# Patient Record
Sex: FEMALE | Race: ASIAN
[De-identification: names, ages, dates, MRNs, and addresses within clinical notes are randomized per-mention and may not be internally consistent; named-entity substitution may affect disease eponyms.]

---

## 2024-09-10 ENCOUNTER — TRANSCRIPTION ENCOUNTER (OUTPATIENT)
Age: 29
End: 2024-09-10

## 2024-09-10 ENCOUNTER — ASOB RESULT (OUTPATIENT)
Age: 29
End: 2024-09-10

## 2024-09-10 ENCOUNTER — APPOINTMENT (OUTPATIENT)
Dept: ANTEPARTUM | Facility: CLINIC | Age: 29
End: 2024-09-10

## 2024-09-10 PROBLEM — Z00.00 ENCOUNTER FOR PREVENTIVE HEALTH EXAMINATION: Status: ACTIVE | Noted: 2024-09-10

## 2024-09-10 PROCEDURE — 76813 OB US NUCHAL MEAS 1 GEST: CPT

## 2024-09-10 PROCEDURE — 76801 OB US < 14 WKS SINGLE FETUS: CPT | Mod: 59

## 2024-09-24 ENCOUNTER — APPOINTMENT (OUTPATIENT)
Dept: OBGYN | Facility: CLINIC | Age: 29
End: 2024-09-24

## 2024-10-01 ENCOUNTER — APPOINTMENT (OUTPATIENT)
Dept: OBGYN | Facility: CLINIC | Age: 29
End: 2024-10-01
Payer: MEDICAID

## 2024-10-01 VITALS
SYSTOLIC BLOOD PRESSURE: 110 MMHG | BODY MASS INDEX: 22.21 KG/M2 | WEIGHT: 113.13 LBS | DIASTOLIC BLOOD PRESSURE: 60 MMHG | HEIGHT: 60 IN

## 2024-10-01 DIAGNOSIS — O21.9 VOMITING OF PREGNANCY, UNSPECIFIED: ICD-10-CM

## 2024-10-01 DIAGNOSIS — Z78.9 OTHER SPECIFIED HEALTH STATUS: ICD-10-CM

## 2024-10-01 PROCEDURE — 99203 OFFICE O/P NEW LOW 30 MIN: CPT | Mod: TH

## 2024-10-01 RX ORDER — DOXYLAMINE SUCCINATE AND PYRIDOXINE HYDROCHLORIDE 10; 10 MG/1; MG/1
10-10 TABLET, DELAYED RELEASE ORAL
Refills: 0 | Status: ACTIVE | COMMUNITY
Start: 2024-10-01

## 2024-10-01 RX ORDER — ONDANSETRON 4 MG/1
4 TABLET, ORALLY DISINTEGRATING ORAL
Qty: 60 | Refills: 0 | Status: ACTIVE | COMMUNITY
Start: 2024-10-01

## 2024-10-02 LAB
C TRACH RRNA SPEC QL NAA+PROBE: NOT DETECTED
N GONORRHOEA RRNA SPEC QL NAA+PROBE: NOT DETECTED
SOURCE AMPLIFICATION: NORMAL

## 2024-10-07 ENCOUNTER — APPOINTMENT (OUTPATIENT)
Dept: OBGYN | Facility: CLINIC | Age: 29
End: 2024-10-07

## 2024-10-08 ENCOUNTER — APPOINTMENT (OUTPATIENT)
Dept: OBGYN | Facility: CLINIC | Age: 29
End: 2024-10-08

## 2024-10-09 ENCOUNTER — INPATIENT (INPATIENT)
Facility: HOSPITAL | Age: 29
LOS: 0 days | Discharge: LEFT BEFORE DISCHARGE | End: 2024-10-10
Attending: INTERNAL MEDICINE | Admitting: INTERNAL MEDICINE
Payer: MEDICAID

## 2024-10-09 VITALS
RESPIRATION RATE: 20 BRPM | DIASTOLIC BLOOD PRESSURE: 82 MMHG | HEIGHT: 60 IN | OXYGEN SATURATION: 99 % | WEIGHT: 113.1 LBS | TEMPERATURE: 97 F | SYSTOLIC BLOOD PRESSURE: 127 MMHG | HEART RATE: 109 BPM

## 2024-10-09 LAB
ALBUMIN SERPL ELPH-MCNC: 3.2 G/DL — LOW (ref 3.3–5)
ALP SERPL-CCNC: 43 U/L — SIGNIFICANT CHANGE UP (ref 40–120)
ALT FLD-CCNC: 30 U/L — SIGNIFICANT CHANGE UP (ref 12–78)
ANION GAP SERPL CALC-SCNC: 10 MMOL/L — SIGNIFICANT CHANGE UP (ref 5–17)
AST SERPL-CCNC: 17 U/L — SIGNIFICANT CHANGE UP (ref 15–37)
BASOPHILS # BLD AUTO: 0.02 K/UL — SIGNIFICANT CHANGE UP (ref 0–0.2)
BASOPHILS NFR BLD AUTO: 0.2 % — SIGNIFICANT CHANGE UP (ref 0–2)
BILIRUB SERPL-MCNC: 0.2 MG/DL — SIGNIFICANT CHANGE UP (ref 0.2–1.2)
BUN SERPL-MCNC: 7 MG/DL — SIGNIFICANT CHANGE UP (ref 7–23)
CALCIUM SERPL-MCNC: 8.8 MG/DL — SIGNIFICANT CHANGE UP (ref 8.5–10.1)
CHLORIDE SERPL-SCNC: 107 MMOL/L — SIGNIFICANT CHANGE UP (ref 96–108)
CO2 SERPL-SCNC: 22 MMOL/L — SIGNIFICANT CHANGE UP (ref 22–31)
CREAT SERPL-MCNC: 0.62 MG/DL — SIGNIFICANT CHANGE UP (ref 0.5–1.3)
D DIMER BLD IA.RAPID-MCNC: 860 NG/ML DDU — HIGH
EGFR: 126 ML/MIN/1.73M2 — SIGNIFICANT CHANGE UP
EOSINOPHIL # BLD AUTO: 0.09 K/UL — SIGNIFICANT CHANGE UP (ref 0–0.5)
EOSINOPHIL NFR BLD AUTO: 1.1 % — SIGNIFICANT CHANGE UP (ref 0–6)
GLUCOSE SERPL-MCNC: 90 MG/DL — SIGNIFICANT CHANGE UP (ref 70–99)
HCG SERPL-ACNC: HIGH MIU/ML
HCT VFR BLD CALC: 32 % — LOW (ref 34.5–45)
HGB BLD-MCNC: 11.4 G/DL — LOW (ref 11.5–15.5)
IMM GRANULOCYTES NFR BLD AUTO: 0.5 % — SIGNIFICANT CHANGE UP (ref 0–0.9)
LYMPHOCYTES # BLD AUTO: 1.41 K/UL — SIGNIFICANT CHANGE UP (ref 1–3.3)
LYMPHOCYTES # BLD AUTO: 17.5 % — SIGNIFICANT CHANGE UP (ref 13–44)
MCHC RBC-ENTMCNC: 30.2 PG — SIGNIFICANT CHANGE UP (ref 27–34)
MCHC RBC-ENTMCNC: 35.6 G/DL — SIGNIFICANT CHANGE UP (ref 32–36)
MCV RBC AUTO: 84.7 FL — SIGNIFICANT CHANGE UP (ref 80–100)
MONOCYTES # BLD AUTO: 0.6 K/UL — SIGNIFICANT CHANGE UP (ref 0–0.9)
MONOCYTES NFR BLD AUTO: 7.4 % — SIGNIFICANT CHANGE UP (ref 2–14)
NEUTROPHILS # BLD AUTO: 5.91 K/UL — SIGNIFICANT CHANGE UP (ref 1.8–7.4)
NEUTROPHILS NFR BLD AUTO: 73.3 % — SIGNIFICANT CHANGE UP (ref 43–77)
NRBC # BLD: 0 /100 WBCS — SIGNIFICANT CHANGE UP (ref 0–0)
PLATELET # BLD AUTO: 227 K/UL — SIGNIFICANT CHANGE UP (ref 150–400)
POTASSIUM SERPL-MCNC: 3.8 MMOL/L — SIGNIFICANT CHANGE UP (ref 3.5–5.3)
POTASSIUM SERPL-SCNC: 3.8 MMOL/L — SIGNIFICANT CHANGE UP (ref 3.5–5.3)
PROT SERPL-MCNC: 6.9 GM/DL — SIGNIFICANT CHANGE UP (ref 6–8.3)
RBC # BLD: 3.78 M/UL — LOW (ref 3.8–5.2)
RBC # FLD: 14.6 % — HIGH (ref 10.3–14.5)
SODIUM SERPL-SCNC: 139 MMOL/L — SIGNIFICANT CHANGE UP (ref 135–145)
TROPONIN I, HIGH SENSITIVITY RESULT: 3 NG/L — SIGNIFICANT CHANGE UP
WBC # BLD: 8.07 K/UL — SIGNIFICANT CHANGE UP (ref 3.8–10.5)
WBC # FLD AUTO: 8.07 K/UL — SIGNIFICANT CHANGE UP (ref 3.8–10.5)

## 2024-10-09 PROCEDURE — 99285 EMERGENCY DEPT VISIT HI MDM: CPT

## 2024-10-09 PROCEDURE — 99222 1ST HOSP IP/OBS MODERATE 55: CPT

## 2024-10-09 PROCEDURE — 93010 ELECTROCARDIOGRAM REPORT: CPT

## 2024-10-09 PROCEDURE — 71045 X-RAY EXAM CHEST 1 VIEW: CPT | Mod: 26

## 2024-10-09 RX ORDER — ACETAMINOPHEN 325 MG
1000 TABLET ORAL ONCE
Refills: 0 | Status: COMPLETED | OUTPATIENT
Start: 2024-10-09 | End: 2024-10-09

## 2024-10-09 RX ORDER — ACETAMINOPHEN 325 MG
650 TABLET ORAL EVERY 6 HOURS
Refills: 0 | Status: DISCONTINUED | OUTPATIENT
Start: 2024-10-09 | End: 2024-10-10

## 2024-10-09 RX ORDER — ENOXAPARIN SODIUM 150 MG/ML
40 INJECTION SUBCUTANEOUS EVERY 24 HOURS
Refills: 0 | Status: DISCONTINUED | OUTPATIENT
Start: 2024-10-09 | End: 2024-10-10

## 2024-10-09 RX ORDER — 5-HYDROXYTRYPTOPHAN (5-HTP) 100 MG
3 TABLET,DISINTEGRATING ORAL AT BEDTIME
Refills: 0 | Status: DISCONTINUED | OUTPATIENT
Start: 2024-10-09 | End: 2024-10-10

## 2024-10-09 RX ORDER — ONDANSETRON HCL/PF 4 MG/2 ML
4 VIAL (ML) INJECTION EVERY 8 HOURS
Refills: 0 | Status: DISCONTINUED | OUTPATIENT
Start: 2024-10-09 | End: 2024-10-10

## 2024-10-09 RX ORDER — MAG HYDROX/ALUMINUM HYD/SIMETH 200-200-20
30 SUSPENSION, ORAL (FINAL DOSE FORM) ORAL EVERY 4 HOURS
Refills: 0 | Status: DISCONTINUED | OUTPATIENT
Start: 2024-10-09 | End: 2024-10-10

## 2024-10-09 RX ADMIN — Medication 400 MILLIGRAM(S): at 22:53

## 2024-10-09 NOTE — ED PROVIDER NOTE - PHYSICAL EXAMINATION
General: Well appearing female in no acute distress  HEENT: Normocephalic, atraumatic. Moist mucous membranes. Oropharynx clear. No lymphadenopathy.  Eyes: No scleral icterus. EOMI. TAWANDA.  Neck:. Soft and supple. Full ROM without pain. No midline tenderness  Cardiac: Regular rate and regular rhythm. No murmurs, rubs, gallops. Peripheral pulses 2+ and symmetric. No LE edema.  Resp: Lungs CTAB. Speaking in full sentences. No wheezes, rales or rhonchi.  Abd: Soft, non-tender, non-distended. No guarding or rebound. No scars, masses, or lesions.  Back: Spine midline and non-tender. No CVA tenderness.    Skin: No rashes, abrasions, or lacerations.  Neuro: AO x 3. Moves all extremities symmetrically. Motor strength and sensation grossly intact.

## 2024-10-09 NOTE — ED ADULT NURSE NOTE - CAS ED AMA FORM SIGNED YN
pt signed however provider was unavailable to sign, RN witnessed. MERI Andres notified and approved pt leaving.

## 2024-10-09 NOTE — ED ADULT NURSE NOTE - OBJECTIVE STATEMENT
AAOx3 pt presents to ED c/o sob starting today associated with chest discomfort, sent by urgent care to r/o PE. Pt also c/o abdominal cramps.  17 weeks gestation a0. At time of assessment respirations spontaneous and unlabored, pt speaks in full sentences. Pt appear comfortable.

## 2024-10-09 NOTE — ED PROVIDER NOTE - CLINICAL SUMMARY MEDICAL DECISION MAKING FREE TEXT BOX
27 y/o F 17 weeks pregnant presents w/ sob. states she was passenger in car today when she felt chest pain w/ sob. went to urgent care and was told she might have a blood clot. patient states chest pain is generalized. 2 years ago did use hormonal OCP. denies prior hx of blood clots. hx of 1 . denies fever/chills. denies dysuria. denies vaginal bleeding. denies abdominal pain.   EKG NSR, non-ischemic  consider PE, will send d-dimer   low clinical suspicion of ACS  reproducible chest wall pain b/l - consider likely MSK / chest wall pain  pain control   patient agreeable to cxr, signed waiver after discussion regarding risks/benefits.     d-dimer elevated, will admit for VQ scan as patient is pregnant, defer CTA at this time. is hemodynamically stable.

## 2024-10-09 NOTE — ED PROVIDER NOTE - OBJECTIVE STATEMENT
29 y/o F 17 weeks pregnant presents w/ sob. states she was passenger in car today when she felt chest pain w/ sob. went to urgent care and was told she might have a blood clot. patient states chest pain is generalized. 2 years ago did use hormonal OCP. denies prior hx of blood clots. hx of 1 . denies fever/chills. denies dysuria. denies vaginal bleeding. denies abdominal pain.

## 2024-10-09 NOTE — ED ADULT TRIAGE NOTE - CHIEF COMPLAINT QUOTE
c/o sob x 1 hour with chest discomfort, sent by urgent care to r/o pe c/o abdominal cramps 17 weeks gestation a0

## 2024-10-10 VITALS
HEART RATE: 78 BPM | RESPIRATION RATE: 14 BRPM | DIASTOLIC BLOOD PRESSURE: 66 MMHG | TEMPERATURE: 98 F | OXYGEN SATURATION: 98 % | SYSTOLIC BLOOD PRESSURE: 109 MMHG

## 2024-10-10 DIAGNOSIS — Z34.00 ENCOUNTER FOR SUPERVISION OF NORMAL FIRST PREGNANCY, UNSPECIFIED TRIMESTER: ICD-10-CM

## 2024-10-10 DIAGNOSIS — Z29.9 ENCOUNTER FOR PROPHYLACTIC MEASURES, UNSPECIFIED: ICD-10-CM

## 2024-10-10 DIAGNOSIS — R07.9 CHEST PAIN, UNSPECIFIED: ICD-10-CM

## 2024-10-10 DIAGNOSIS — R06.00 DYSPNEA, UNSPECIFIED: ICD-10-CM

## 2024-10-10 LAB
A1C WITH ESTIMATED AVERAGE GLUCOSE RESULT: 4.8 % — SIGNIFICANT CHANGE UP (ref 4–5.6)
ALBUMIN SERPL ELPH-MCNC: 3 G/DL — LOW (ref 3.3–5)
ALP SERPL-CCNC: 39 U/L — LOW (ref 40–120)
ALT FLD-CCNC: 25 U/L — SIGNIFICANT CHANGE UP (ref 12–78)
ANION GAP SERPL CALC-SCNC: 4 MMOL/L — LOW (ref 5–17)
AST SERPL-CCNC: 15 U/L — SIGNIFICANT CHANGE UP (ref 15–37)
BASOPHILS # BLD AUTO: 0.01 K/UL — SIGNIFICANT CHANGE UP (ref 0–0.2)
BASOPHILS NFR BLD AUTO: 0.1 % — SIGNIFICANT CHANGE UP (ref 0–2)
BILIRUB SERPL-MCNC: 0.3 MG/DL — SIGNIFICANT CHANGE UP (ref 0.2–1.2)
BUN SERPL-MCNC: 6 MG/DL — LOW (ref 7–23)
CALCIUM SERPL-MCNC: 8.2 MG/DL — LOW (ref 8.5–10.1)
CHLORIDE SERPL-SCNC: 108 MMOL/L — SIGNIFICANT CHANGE UP (ref 96–108)
CHOLEST SERPL-MCNC: 174 MG/DL — SIGNIFICANT CHANGE UP
CO2 SERPL-SCNC: 25 MMOL/L — SIGNIFICANT CHANGE UP (ref 22–31)
CREAT SERPL-MCNC: 0.46 MG/DL — LOW (ref 0.5–1.3)
EGFR: 135 ML/MIN/1.73M2 — SIGNIFICANT CHANGE UP
EOSINOPHIL # BLD AUTO: 0.11 K/UL — SIGNIFICANT CHANGE UP (ref 0–0.5)
EOSINOPHIL NFR BLD AUTO: 1.6 % — SIGNIFICANT CHANGE UP (ref 0–6)
ESTIMATED AVERAGE GLUCOSE: 91 MG/DL — SIGNIFICANT CHANGE UP (ref 68–114)
GLUCOSE SERPL-MCNC: 87 MG/DL — SIGNIFICANT CHANGE UP (ref 70–99)
HCT VFR BLD CALC: 29.5 % — LOW (ref 34.5–45)
HDLC SERPL-MCNC: 70 MG/DL — SIGNIFICANT CHANGE UP
HGB BLD-MCNC: 10.2 G/DL — LOW (ref 11.5–15.5)
IMM GRANULOCYTES NFR BLD AUTO: 0.6 % — SIGNIFICANT CHANGE UP (ref 0–0.9)
LIPID PNL WITH DIRECT LDL SERPL: 93 MG/DL — SIGNIFICANT CHANGE UP
LYMPHOCYTES # BLD AUTO: 1.62 K/UL — SIGNIFICANT CHANGE UP (ref 1–3.3)
LYMPHOCYTES # BLD AUTO: 24.1 % — SIGNIFICANT CHANGE UP (ref 13–44)
MAGNESIUM SERPL-MCNC: 1.9 MG/DL — SIGNIFICANT CHANGE UP (ref 1.6–2.6)
MCHC RBC-ENTMCNC: 29.7 PG — SIGNIFICANT CHANGE UP (ref 27–34)
MCHC RBC-ENTMCNC: 34.6 G/DL — SIGNIFICANT CHANGE UP (ref 32–36)
MCV RBC AUTO: 86 FL — SIGNIFICANT CHANGE UP (ref 80–100)
MONOCYTES # BLD AUTO: 0.59 K/UL — SIGNIFICANT CHANGE UP (ref 0–0.9)
MONOCYTES NFR BLD AUTO: 8.8 % — SIGNIFICANT CHANGE UP (ref 2–14)
NEUTROPHILS # BLD AUTO: 4.35 K/UL — SIGNIFICANT CHANGE UP (ref 1.8–7.4)
NEUTROPHILS NFR BLD AUTO: 64.8 % — SIGNIFICANT CHANGE UP (ref 43–77)
NON HDL CHOLESTEROL: 104 MG/DL — SIGNIFICANT CHANGE UP
NRBC # BLD: 0 /100 WBCS — SIGNIFICANT CHANGE UP (ref 0–0)
PHOSPHATE SERPL-MCNC: 3.8 MG/DL — SIGNIFICANT CHANGE UP (ref 2.5–4.5)
PLATELET # BLD AUTO: 200 K/UL — SIGNIFICANT CHANGE UP (ref 150–400)
POTASSIUM SERPL-MCNC: 3.4 MMOL/L — LOW (ref 3.5–5.3)
POTASSIUM SERPL-SCNC: 3.4 MMOL/L — LOW (ref 3.5–5.3)
PROT SERPL-MCNC: 6.3 GM/DL — SIGNIFICANT CHANGE UP (ref 6–8.3)
RBC # BLD: 3.43 M/UL — LOW (ref 3.8–5.2)
RBC # FLD: 14.6 % — HIGH (ref 10.3–14.5)
SODIUM SERPL-SCNC: 137 MMOL/L — SIGNIFICANT CHANGE UP (ref 135–145)
TRIGL SERPL-MCNC: 54 MG/DL — SIGNIFICANT CHANGE UP
WBC # BLD: 6.72 K/UL — SIGNIFICANT CHANGE UP (ref 3.8–10.5)
WBC # FLD AUTO: 6.72 K/UL — SIGNIFICANT CHANGE UP (ref 3.8–10.5)

## 2024-10-10 NOTE — ED ADULT NURSE REASSESSMENT NOTE - NS ED NURSE REASSESS COMMENT FT1
pt left .AMA, form signed by pt and EMILE Melgoza, however pt insisted that she needed to leave prior to MERI Andres being available to come down and speak with pt. pt A&Ox4 escorted by SO out of ED and stated she will "follow up with OB". MERI Heard notified.

## 2024-10-10 NOTE — CHART NOTE - NSCHARTNOTEFT_GEN_A_CORE
Called by RN as patient was requesting to leave AMA. At that time I was involved in a high priority other patient-impacting issue and was unable to present to the bedside to speak to the patient. Immediately after, I attempted to go speak to the patient about her concerns and risks of leaving AMA but was notified that she already left the hospital.   RN documents that patient was A&Ox4 and appeared to have capacity to make medical decisions.

## 2024-10-10 NOTE — H&P ADULT - PROBLEM SELECTOR PLAN 1
- Presented with dyspnea possibly due to mechanical pressure in the setting of pregnancy, rule out PE  - D-dimer 860  - EKG, troponin, and CXR wnl  - Currently with good O2 sat on room air  - f/u V/Q scan - Presented with dyspnea possibly due to mechanical pressure in the setting of pregnancy, rule out other etiologies including PE  - D-dimer 860  - EKG, troponin, and CXR wnl  - Currently with good O2 sat on room air  - f/u V/Q scan

## 2024-10-10 NOTE — H&P ADULT - ASSESSMENT
28-year-old female at 17 weeks gestation,  who was sent to the ED by urgent care for shortness of breath and chest pain. . On presentation, slightly tachycardic otherwise stable vital signs. Labs revealed D-dimer 860.

## 2024-10-10 NOTE — H&P ADULT - HISTORY OF PRESENT ILLNESS
28-year-old female at 17 weeks gestation,  who was sent to the ED by urgent care for shortness of breath and chest pain. The patient describes chest pain as left-sided, achy, lasting less than a minute, associated with shortness of breath. She denies any cough, sick contact, fevers, chills, heart palpitations, or any other active complaints.   On presentation, slightly tachycardic otherwise stable vital signs. Labs revealed D-dimer 860.

## 2024-10-10 NOTE — H&P ADULT - NSHPPHYSICALEXAM_GEN_ALL_CORE
GENERAL: NAD, lying in bed comfortably  HEAD:  Atraumatic, normocephalic  EYES: EOMI, PERRL  NECK: Supple, trachea midline, no JVD  HEART: Regular rate and rhythm  LUNGS: Unlabored respirations.  Clear to auscultation bilaterally, no crackles, wheezing, or rhonchi  ABDOMEN: Soft, nontender, nondistended, +BS  EXTREMITIES: 2+ peripheral pulses bilaterally. No clubbing, cyanosis, or edema  NERVOUS SYSTEM:  A&Ox3, moving all extremities, no focal deficits

## 2024-10-10 NOTE — H&P ADULT - NSHPLABSRESULTS_GEN_ALL_CORE
LABS:  cret                        11.4   8.07  )-----------( 227      ( 09 Oct 2024 21:39 )             32.0     10-09    139  |  107  |  7   ----------------------------<  90  3.8   |  22  |  0.62    Ca    8.8      09 Oct 2024 21:39    TPro  6.9  /  Alb  3.2[L]  /  TBili  0.2  /  DBili  x   /  AST  17  /  ALT  30  /  AlkPhos  43  10-09

## 2024-10-10 NOTE — ED ADULT NURSE REASSESSMENT NOTE - NS ED NURSE REASSESS COMMENT FT1
RN called to pt room. pt stated that she has "repeatedly asked to leave and have IV taken out". RN explained that being that pt is already admitted to floor, the providers in the ED cannot sign her out. RN paged covering MERI Andres who stated that if "pt wants to leave .AMA she can" and to provide pt with papers if she is not willing to wait for PA to come down and speak with her. Pt stated that she is not waiting any longer and wants to leave now. pt discharged AMA. pt educated on the risks of leaving by EMILE Melgoza. pt stated that she will follow up with OBGYN once she leaves hospital. pt. AxO4, SO at bedside.VSS. Hep lock removed and site dressed.

## 2024-10-16 ENCOUNTER — OUTPATIENT (OUTPATIENT)
Dept: OUTPATIENT SERVICES | Facility: HOSPITAL | Age: 29
LOS: 1 days | End: 2024-10-16

## 2024-10-16 ENCOUNTER — APPOINTMENT (OUTPATIENT)
Dept: OBGYN | Facility: CLINIC | Age: 29
End: 2024-10-16
Payer: MEDICAID

## 2024-10-16 ENCOUNTER — OUTPATIENT (OUTPATIENT)
Dept: OUTPATIENT SERVICES | Facility: HOSPITAL | Age: 29
LOS: 1 days | End: 2024-10-16
Payer: COMMERCIAL

## 2024-10-16 ENCOUNTER — APPOINTMENT (OUTPATIENT)
Dept: ANTEPARTUM | Facility: CLINIC | Age: 29
End: 2024-10-16

## 2024-10-16 VITALS — TEMPERATURE: 99 F | RESPIRATION RATE: 15 BRPM

## 2024-10-16 VITALS
OXYGEN SATURATION: 97 % | DIASTOLIC BLOOD PRESSURE: 72 MMHG | SYSTOLIC BLOOD PRESSURE: 103 MMHG | RESPIRATION RATE: 18 BRPM | TEMPERATURE: 97 F | HEART RATE: 77 BPM | WEIGHT: 113.1 LBS | HEIGHT: 60 IN

## 2024-10-16 VITALS — HEART RATE: 82 BPM | OXYGEN SATURATION: 97 %

## 2024-10-16 VITALS
BODY MASS INDEX: 22.48 KG/M2 | DIASTOLIC BLOOD PRESSURE: 74 MMHG | WEIGHT: 114.5 LBS | HEIGHT: 60 IN | SYSTOLIC BLOOD PRESSURE: 112 MMHG

## 2024-10-16 DIAGNOSIS — Z33.2 ENCOUNTER FOR ELECTIVE TERMINATION OF PREGNANCY: ICD-10-CM

## 2024-10-16 DIAGNOSIS — O26.899 OTHER SPECIFIED PREGNANCY RELATED CONDITIONS, UNSPECIFIED TRIMESTER: ICD-10-CM

## 2024-10-16 LAB
ADD ON TEST-SPECIMEN IN LAB: SIGNIFICANT CHANGE UP
ALBUMIN SERPL ELPH-MCNC: 3.6 G/DL — SIGNIFICANT CHANGE UP (ref 3.3–5)
ALBUMIN SERPL ELPH-MCNC: 3.8 G/DL — SIGNIFICANT CHANGE UP (ref 3.3–5)
ALP SERPL-CCNC: 39 U/L — LOW (ref 40–120)
ALP SERPL-CCNC: 42 U/L — SIGNIFICANT CHANGE UP (ref 40–120)
ALT FLD-CCNC: 37 U/L — SIGNIFICANT CHANGE UP (ref 10–45)
ALT FLD-CCNC: 38 U/L — HIGH (ref 4–33)
ANION GAP SERPL CALC-SCNC: 10 MMOL/L — SIGNIFICANT CHANGE UP (ref 7–14)
ANION GAP SERPL CALC-SCNC: 14 MMOL/L — SIGNIFICANT CHANGE UP (ref 5–17)
APPEARANCE UR: ABNORMAL
AST SERPL-CCNC: 27 U/L — SIGNIFICANT CHANGE UP (ref 4–32)
AST SERPL-CCNC: 30 U/L — SIGNIFICANT CHANGE UP (ref 10–40)
BACTERIA # UR AUTO: ABNORMAL /HPF
BASOPHILS # BLD AUTO: 0.02 K/UL — SIGNIFICANT CHANGE UP (ref 0–0.2)
BASOPHILS # BLD AUTO: 0.03 K/UL — SIGNIFICANT CHANGE UP (ref 0–0.2)
BASOPHILS NFR BLD AUTO: 0.3 % — SIGNIFICANT CHANGE UP (ref 0–2)
BASOPHILS NFR BLD AUTO: 0.4 % — SIGNIFICANT CHANGE UP (ref 0–2)
BILIRUB SERPL-MCNC: 0.2 MG/DL — SIGNIFICANT CHANGE UP (ref 0.2–1.2)
BILIRUB SERPL-MCNC: <0.2 MG/DL — SIGNIFICANT CHANGE UP (ref 0.2–1.2)
BILIRUB UR-MCNC: NEGATIVE — SIGNIFICANT CHANGE UP
BLD GP AB SCN SERPL QL: NEGATIVE — SIGNIFICANT CHANGE UP
BUN SERPL-MCNC: 6 MG/DL — LOW (ref 7–23)
BUN SERPL-MCNC: 6 MG/DL — LOW (ref 7–23)
CALCIUM SERPL-MCNC: 8.6 MG/DL — SIGNIFICANT CHANGE UP (ref 8.4–10.5)
CALCIUM SERPL-MCNC: 8.6 MG/DL — SIGNIFICANT CHANGE UP (ref 8.4–10.5)
CAST: 2 /LPF — SIGNIFICANT CHANGE UP (ref 0–4)
CHLORIDE SERPL-SCNC: 103 MMOL/L — SIGNIFICANT CHANGE UP (ref 98–107)
CHLORIDE SERPL-SCNC: 105 MMOL/L — SIGNIFICANT CHANGE UP (ref 96–108)
CO2 SERPL-SCNC: 21 MMOL/L — LOW (ref 22–31)
CO2 SERPL-SCNC: 23 MMOL/L — SIGNIFICANT CHANGE UP (ref 22–31)
COLOR SPEC: YELLOW — SIGNIFICANT CHANGE UP
CREAT SERPL-MCNC: 0.5 MG/DL — SIGNIFICANT CHANGE UP (ref 0.5–1.3)
CREAT SERPL-MCNC: 0.57 MG/DL — SIGNIFICANT CHANGE UP (ref 0.5–1.3)
CRP SERPL-MCNC: <3 MG/L — SIGNIFICANT CHANGE UP
DIFF PNL FLD: NEGATIVE — SIGNIFICANT CHANGE UP
EGFR: 128 ML/MIN/1.73M2 — SIGNIFICANT CHANGE UP
EGFR: 133 ML/MIN/1.73M2 — SIGNIFICANT CHANGE UP
EOSINOPHIL # BLD AUTO: 0.07 K/UL — SIGNIFICANT CHANGE UP (ref 0–0.5)
EOSINOPHIL # BLD AUTO: 0.1 K/UL — SIGNIFICANT CHANGE UP (ref 0–0.5)
EOSINOPHIL NFR BLD AUTO: 1.1 % — SIGNIFICANT CHANGE UP (ref 0–6)
EOSINOPHIL NFR BLD AUTO: 1.4 % — SIGNIFICANT CHANGE UP (ref 0–6)
FERRITIN SERPL-MCNC: 18 NG/ML — SIGNIFICANT CHANGE UP (ref 15–150)
FOLATE SERPL-MCNC: 11.9 NG/ML — SIGNIFICANT CHANGE UP
GLUCOSE SERPL-MCNC: 74 MG/DL — SIGNIFICANT CHANGE UP (ref 70–99)
GLUCOSE SERPL-MCNC: 81 MG/DL — SIGNIFICANT CHANGE UP (ref 70–99)
GLUCOSE UR QL: NEGATIVE MG/DL — SIGNIFICANT CHANGE UP
HCT VFR BLD CALC: 29.7 % — LOW (ref 34.5–45)
HCT VFR BLD CALC: 31.1 % — LOW (ref 34.5–45)
HGB BLD-MCNC: 10.4 G/DL — LOW (ref 11.5–15.5)
HGB BLD-MCNC: 11.2 G/DL — LOW (ref 11.5–15.5)
IANC: 4.7 K/UL — SIGNIFICANT CHANGE UP (ref 1.8–7.4)
IMM GRANULOCYTES NFR BLD AUTO: 1 % — HIGH (ref 0–0.9)
IMM GRANULOCYTES NFR BLD AUTO: 1.1 % — HIGH (ref 0–0.9)
IRON SATN MFR SERPL: 20 % — SIGNIFICANT CHANGE UP (ref 14–50)
IRON SATN MFR SERPL: 73 UG/DL — SIGNIFICANT CHANGE UP (ref 30–160)
KETONES UR-MCNC: NEGATIVE MG/DL — SIGNIFICANT CHANGE UP
LEUKOCYTE ESTERASE UR-ACNC: ABNORMAL
LYMPHOCYTES # BLD AUTO: 1.13 K/UL — SIGNIFICANT CHANGE UP (ref 1–3.3)
LYMPHOCYTES # BLD AUTO: 1.44 K/UL — SIGNIFICANT CHANGE UP (ref 1–3.3)
LYMPHOCYTES # BLD AUTO: 17.3 % — SIGNIFICANT CHANGE UP (ref 13–44)
LYMPHOCYTES # BLD AUTO: 20.3 % — SIGNIFICANT CHANGE UP (ref 13–44)
MAGNESIUM SERPL-MCNC: 1.7 MG/DL — SIGNIFICANT CHANGE UP (ref 1.6–2.6)
MCHC RBC-ENTMCNC: 29.9 PG — SIGNIFICANT CHANGE UP (ref 27–34)
MCHC RBC-ENTMCNC: 30.3 PG — SIGNIFICANT CHANGE UP (ref 27–34)
MCHC RBC-ENTMCNC: 35 GM/DL — SIGNIFICANT CHANGE UP (ref 32–36)
MCHC RBC-ENTMCNC: 36 GM/DL — SIGNIFICANT CHANGE UP (ref 32–36)
MCV RBC AUTO: 83.2 FL — SIGNIFICANT CHANGE UP (ref 80–100)
MCV RBC AUTO: 86.6 FL — SIGNIFICANT CHANGE UP (ref 80–100)
MONOCYTES # BLD AUTO: 0.53 K/UL — SIGNIFICANT CHANGE UP (ref 0–0.9)
MONOCYTES # BLD AUTO: 0.63 K/UL — SIGNIFICANT CHANGE UP (ref 0–0.9)
MONOCYTES NFR BLD AUTO: 8.1 % — SIGNIFICANT CHANGE UP (ref 2–14)
MONOCYTES NFR BLD AUTO: 8.9 % — SIGNIFICANT CHANGE UP (ref 2–14)
NEUTROPHILS # BLD AUTO: 4.7 K/UL — SIGNIFICANT CHANGE UP (ref 1.8–7.4)
NEUTROPHILS # BLD AUTO: 4.84 K/UL — SIGNIFICANT CHANGE UP (ref 1.8–7.4)
NEUTROPHILS NFR BLD AUTO: 68 % — SIGNIFICANT CHANGE UP (ref 43–77)
NEUTROPHILS NFR BLD AUTO: 72.1 % — SIGNIFICANT CHANGE UP (ref 43–77)
NITRITE UR-MCNC: NEGATIVE — SIGNIFICANT CHANGE UP
NRBC # BLD: 0 /100 WBCS — SIGNIFICANT CHANGE UP (ref 0–0)
NRBC # FLD: 0 K/UL — SIGNIFICANT CHANGE UP (ref 0–0)
PH UR: 5.5 — SIGNIFICANT CHANGE UP (ref 5–8)
PLATELET # BLD AUTO: 197 K/UL — SIGNIFICANT CHANGE UP (ref 150–400)
PLATELET # BLD AUTO: 222 K/UL — SIGNIFICANT CHANGE UP (ref 150–400)
POTASSIUM SERPL-MCNC: 3.6 MMOL/L — SIGNIFICANT CHANGE UP (ref 3.5–5.3)
POTASSIUM SERPL-MCNC: 3.7 MMOL/L — SIGNIFICANT CHANGE UP (ref 3.5–5.3)
POTASSIUM SERPL-MCNC: 4.1 MMOL/L — SIGNIFICANT CHANGE UP (ref 3.5–5.3)
POTASSIUM SERPL-SCNC: 3.6 MMOL/L — SIGNIFICANT CHANGE UP (ref 3.5–5.3)
POTASSIUM SERPL-SCNC: 3.7 MMOL/L — SIGNIFICANT CHANGE UP (ref 3.5–5.3)
POTASSIUM SERPL-SCNC: 4.1 MMOL/L — SIGNIFICANT CHANGE UP (ref 3.5–5.3)
PROT SERPL-MCNC: 6 G/DL — SIGNIFICANT CHANGE UP (ref 6–8.3)
PROT SERPL-MCNC: 6.3 G/DL — SIGNIFICANT CHANGE UP (ref 6–8.3)
PROT UR-MCNC: SIGNIFICANT CHANGE UP MG/DL
RBC # BLD: 3.43 M/UL — LOW (ref 3.8–5.2)
RBC # BLD: 3.74 M/UL — LOW (ref 3.8–5.2)
RBC # FLD: 14.1 % — SIGNIFICANT CHANGE UP (ref 10.3–14.5)
RBC # FLD: 14.4 % — SIGNIFICANT CHANGE UP (ref 10.3–14.5)
RBC CASTS # UR COMP ASSIST: 1 /HPF — SIGNIFICANT CHANGE UP (ref 0–4)
RETICS #: 75.9 K/UL — SIGNIFICANT CHANGE UP (ref 25–125)
RETICS/RBC NFR: 2 % — SIGNIFICANT CHANGE UP (ref 0.5–2.5)
REVIEW: SIGNIFICANT CHANGE UP
RH IG SCN BLD-IMP: POSITIVE — SIGNIFICANT CHANGE UP
RH IG SCN BLD-IMP: POSITIVE — SIGNIFICANT CHANGE UP
SODIUM SERPL-SCNC: 136 MMOL/L — SIGNIFICANT CHANGE UP (ref 135–145)
SODIUM SERPL-SCNC: 140 MMOL/L — SIGNIFICANT CHANGE UP (ref 135–145)
SP GR SPEC: 1.02 — SIGNIFICANT CHANGE UP (ref 1–1.03)
SQUAMOUS # UR AUTO: 7 /HPF — HIGH (ref 0–5)
TIBC SERPL-MCNC: 358 UG/DL — SIGNIFICANT CHANGE UP (ref 220–430)
UIBC SERPL-MCNC: 286 UG/DL — SIGNIFICANT CHANGE UP (ref 110–370)
UROBILINOGEN FLD QL: 0.2 MG/DL — SIGNIFICANT CHANGE UP (ref 0.2–1)
VIT B12 SERPL-MCNC: 419 PG/ML — SIGNIFICANT CHANGE UP (ref 232–1245)
WBC # BLD: 6.52 K/UL — SIGNIFICANT CHANGE UP (ref 3.8–10.5)
WBC # BLD: 7.11 K/UL — SIGNIFICANT CHANGE UP (ref 3.8–10.5)
WBC # FLD AUTO: 6.52 K/UL — SIGNIFICANT CHANGE UP (ref 3.8–10.5)
WBC # FLD AUTO: 7.11 K/UL — SIGNIFICANT CHANGE UP (ref 3.8–10.5)
WBC UR QL: 7 /HPF — HIGH (ref 0–5)

## 2024-10-16 PROCEDURE — 99213 OFFICE O/P EST LOW 20 MIN: CPT | Mod: TH

## 2024-10-16 PROCEDURE — 99221 1ST HOSP IP/OBS SF/LOW 40: CPT

## 2024-10-16 RX ORDER — ONDANSETRON HCL/PF 4 MG/2 ML
1 VIAL (ML) INJECTION
Refills: 0 | DISCHARGE

## 2024-10-16 RX ORDER — ACETAMINOPHEN 325 MG
1 TABLET ORAL
Refills: 0 | DISCHARGE

## 2024-10-16 NOTE — H&P PST ADULT - CARDIOVASCULAR COMMENTS
presented to ER at [facility] for chest pain on 10/9/24 - found to have elevated D-Dimer, signed out AMA, Presented to ED at [facility] 10/10/24 - CT angio of chest, b/l lower extremity dopplers done - results negative (copy in chart)

## 2024-10-16 NOTE — H&P PST ADULT - PATIENT ON (OXYGEN DELIVERY METHOD)
EPWORTH SLEEPINESS SCALE KEY:     room air

## 2024-10-16 NOTE — OB PROVIDER TRIAGE NOTE - NSOBPROVIDERNOTE_OBGYN_ALL_OB_FT
Plan D/W Dr. Hendrix, patient to be dc'd home after not wanting admission.  Take Nexium 24 hour every day   IF no improvement after that, add Diclegis.     D/W Dr. Chi, follow up as scheduled.  Return for vomiting unrelieved by meds, leaking of fluid, vaginal bleeding, abdominal pain or any feeling of severe anxiety or suicidal ideation.

## 2024-10-16 NOTE — OB PROVIDER TRIAGE NOTE - ADDITIONAL INSTRUCTIONS
Take Nexium 24 hour every day   IF no improvement after that, add Diclegis.  Return for vomiting unrelieved by meds, leaking of fluid, vaginal bleeding, abdominal pain or any feeling of severe anxiety or suicidal ideation.

## 2024-10-16 NOTE — OB RN TRIAGE NOTE - FALL HARM RISK - UNIVERSAL INTERVENTIONS
Bed in lowest position, wheels locked, appropriate side rails in place/Call bell, personal items and telephone in reach/Instruct patient to call for assistance before getting out of bed or chair/Non-slip footwear when patient is out of bed/[place] to call system/Physically safe environment - no spills, clutter or unnecessary equipment/Purposeful Proactive Rounding/Room/bathroom lighting operational, light cord in reach

## 2024-10-16 NOTE — OB PROVIDER TRIAGE NOTE - NSHPPHYSICALEXAM_GEN_ALL_CORE
Dr. Hendrix in to talk to patient. Had partner leave the room and talked to patient at length. Patient comfortable to disclose everything in front of partner.   Patient A&O x 3, seems very pleasant and appropriate.   Partner engaging in answering questions and seems supportive.  VSS abdomen soft, NT  /61, pulse 91  Limited US performed- posterior placenta, . MVP 3.7-saved in asob  No ctx on toco    Orthostatic BP's as follows:  Lying- 96/52  sitting- 102/56  standing- 112/61    CBC, CMP, UA and magnesium sent    Plan by Dr. Hendrix for admission for antiemetic meds, IVH and SW/Psych consult    Patient reports she does not want to be admitted at this time.  Dr. Hendrix made aware.   Dr. Chi made aware, will try to contact Psych at [facility] at this time. Unable to reach, left voicemail and requested that they make her appointment ASAP

## 2024-10-16 NOTE — OB PROVIDER TRIAGE NOTE - NSHPLABSRESULTS_GEN_ALL_CORE
Urinalysis Basic - ( 16 Oct 2024 15:00 )    Color: Yellow / Appearance: Cloudy / S.023 / pH: x  Gluc: 81 mg/dL / Ketone: Negative mg/dL  / Bili: Negative / Urobili: 0.2 mg/dL   Blood: x / Protein: Trace mg/dL / Nitrite: Negative   Leuk Esterase: Small / RBC: 1 /HPF / WBC 7 /HPF   Sq Epi: x / Non Sq Epi: 7 /HPF / Bacteria: Many /HPF                          10.4   6.52  )-----------( 197      ( 16 Oct 2024 15:00 )             29.7   10    136  |  103  |  6[L]  ----------------------------<  81  3.6   |  23  |  0.57    Ca    8.6      16 Oct 2024 15:00  Mg     1.70     10-    TPro  6.3  /  Alb  3.6  /  TBili  <0.2  /  DBili  x   /  AST  27  /  ALT  38[H]  /  AlkPhos  39[L]  10-16 Urinalysis Basic - ( 16 Oct 2024 15:00 )    Color: Yellow / Appearance: Cloudy / S.023 / pH: x  Gluc: 81 mg/dL / Ketone: Negative mg/dL  / Bili: Negative / Urobili: 0.2 mg/dL   Blood: x / Protein: Trace mg/dL / Nitrite: Negative   Leuk Esterase: Small / RBC: 1 /HPF / WBC 7 /HPF   Sq Epi: x / Non Sq Epi: 7 /HPF / Bacteria: Many /HPF                          10.4   6.52  )-----------( 197      ( 16 Oct 2024 15:00 )             29.7   10-16    136  |  103  |  6[L]  ----------------------------<  81  3.6   |  23  |  0.57    Ca    8.6      16 Oct 2024 15:00  Mg     1.70     10-16    TPro  6.3  /  Alb  3.6  /  TBili  <0.2  /  DBili  x   /  AST  27  /  ALT  38[H]  /  AlkPhos  39[L]  10-16    Magnesium 1.7

## 2024-10-16 NOTE — OB RN TRIAGE NOTE - NSICDXPASTMEDICALHX_GEN_ALL_CORE_FT
PAST MEDICAL HISTORY:  Acid reflux     Anxiety     H/O idiopathic urticaria     Heart murmur     Hyperemesis

## 2024-10-16 NOTE — OB PROVIDER TRIAGE NOTE - HISTORY OF PRESENT ILLNESS
27yo  @ 18.2 sent from [facility]. Patient had appointment for D&E today due to severe hyperemesis causing severe anxiety. Patient desires pregnancy and was sent for [facility] evaluation and evaluation for hyperemesis. Patient reports she had much more severe vomiting at the beginning of the pregnancy and at that time had suicidal ideation although had no plan.   Reports she vomits 3-5 times a day. Has Rx for Diclegis but did not fill it because she did not like the studies on it. Tried Reglan weeks ago but it caused palpitations and a headache. Patient now takes Zofran PRN which does help but she works from home and it makes her very sleepy making her unable to work and she is unable to take a leave from work.   At this time, reports she feels perfectly fine. Denies nausea. Denies OB complaints. Had Beef, rice and beans at 1245 today and vomited a little.   Was seen in ED on 10/10 for SOB/Chest pain and signed out AMA  Reports she filled out application for the [facility]  program and should be hearing from them by the end of the week. Denies suicidal ideation at this time.  Patient with partner for 6 years and was happy about pregnancy.     H/O Anxiety- has never taken meds, but is open to the idea at this time- speaks to therapist weekly.   Acid Reflux

## 2024-10-16 NOTE — H&P PST ADULT - HISTORY OF PRESENT ILLNESS
28 year old female 18 weeks pregnant with severe hyperemesis, presents to [facility] for evaluation, encounter for elective termination of pregnancy. Patient is scheduled for Dilation and Evacuation with Ultrasound Guidance on 10/17/24.

## 2024-10-16 NOTE — OB PROVIDER TRIAGE NOTE - TIME BILLING
MFM attg  Pt seen and evaluated/counseled by me extensively. She denies feeling unwell at current time and tolerating po most days, with occasional day of minimal solids, tolerating some liquids even on worst days. She has relief with Zofran but nausea triggers severe anxiety for her. At 6 weeks gestation she thought of suicide due to anxiety but never a plan and no such thoughts since then. She has never had psychopharmacological therapy, has regular psychotherapist who has been out if town past weeks. She has registered for [facility] psych MD and awaiting appt.  PE and labs support she is euvolemic.   This is desired unplanned pregnancy, has been with her  6 yrs, who is supportive- she also has other family support. She said she had a similar episode of severe anxiety associated with a "health scare" in the past.  I strongly recommended psych consult with pharmacotherapy, if indicated and she is willing. Also discussed optimizing medical therapy for HG - as inpatient which she agreed to, then declined with ACP. She understands that it may take time to improve her symptoms and she may still decide to interrupt the pregnancy at a later date if condition becomes intolerable.  D/c home as preferred by pt  cont Zofran, start PPI  f/u with POB (d/w Dr Chi)  [facility] appt pending

## 2024-10-16 NOTE — OB PROVIDER TRIAGE NOTE - NS_OBGYNHISTORY_OBGYN_ALL_OB_FT
AP course complicated by hyperemesis, otherwise uncomplicated.    Next OB appoitnemnt in 1 week  For Anatomy scan 10/25

## 2024-10-16 NOTE — H&P PST ADULT - NSICDXFAMILYHX_GEN_ALL_CORE_FT
FAMILY HISTORY:  Father  Still living? Yes, Estimated age: Age Unknown  FH: hypertension, Age at diagnosis: Age Unknown    Grandparent  Still living? No  FH: stroke, Age at diagnosis: Age Unknown

## 2024-10-16 NOTE — H&P PST ADULT - PROBLEM SELECTOR PLAN 1
Patient is tentatively scheduled for Dilation and Evacuation with Ultrasound Guidance on 10/17/24. Pre-op instructions provided to patient. Patient given verbal and written instructions. Patient verbalized understanding.     T&S, ABO, CBC sent STAT at PST  Repeat K sent STAT(K 3.4 from 10/10/24, hx of hyperemesis)

## 2024-10-16 NOTE — H&P PST ADULT - ENMT COMMENTS
MALLAMPATI II Denies dentures, denies loose teeth no children, mother dm, breast cancer, father htn, 3 siblings a&w/Single

## 2024-10-17 ENCOUNTER — APPOINTMENT (OUTPATIENT)
Dept: OBGYN | Facility: HOSPITAL | Age: 29
End: 2024-10-17

## 2024-10-17 DIAGNOSIS — R11.10 VOMITING, UNSPECIFIED: ICD-10-CM

## 2024-10-18 DIAGNOSIS — R07.9 CHEST PAIN, UNSPECIFIED: ICD-10-CM

## 2024-10-18 DIAGNOSIS — R06.00 DYSPNEA, UNSPECIFIED: ICD-10-CM

## 2024-10-18 DIAGNOSIS — O99.891 OTHER SPECIFIED DISEASES AND CONDITIONS COMPLICATING PREGNANCY: ICD-10-CM

## 2024-10-18 DIAGNOSIS — Z3A.17 17 WEEKS GESTATION OF PREGNANCY: ICD-10-CM

## 2024-10-23 PROBLEM — R01.1 CARDIAC MURMUR, UNSPECIFIED: Chronic | Status: ACTIVE | Noted: 2024-10-16

## 2024-10-23 PROBLEM — Z87.2 PERSONAL HISTORY OF DISEASES OF THE SKIN AND SUBCUTANEOUS TISSUE: Chronic | Status: ACTIVE | Noted: 2024-10-16

## 2024-10-25 ENCOUNTER — APPOINTMENT (OUTPATIENT)
Dept: ANTEPARTUM | Facility: CLINIC | Age: 29
End: 2024-10-25
Payer: COMMERCIAL

## 2024-10-25 ENCOUNTER — ASOB RESULT (OUTPATIENT)
Age: 29
End: 2024-10-25

## 2024-10-25 PROCEDURE — 76811 OB US DETAILED SNGL FETUS: CPT

## 2024-10-30 DIAGNOSIS — Z91.51 PERSONAL HISTORY OF SUICIDAL BEHAVIOR: ICD-10-CM

## 2024-10-30 DIAGNOSIS — Z3A.18 18 WEEKS GESTATION OF PREGNANCY: ICD-10-CM

## 2024-10-30 DIAGNOSIS — O99.342 OTHER MENTAL DISORDERS COMPLICATING PREGNANCY, SECOND TRIMESTER: ICD-10-CM

## 2024-10-31 PROBLEM — K21.9 GASTRO-ESOPHAGEAL REFLUX DISEASE WITHOUT ESOPHAGITIS: Chronic | Status: ACTIVE | Noted: 2024-10-16

## 2024-11-13 ENCOUNTER — APPOINTMENT (OUTPATIENT)
Dept: ANTEPARTUM | Facility: CLINIC | Age: 29
End: 2024-11-13
Payer: MEDICAID

## 2024-11-13 ENCOUNTER — ASOB RESULT (OUTPATIENT)
Age: 29
End: 2024-11-13

## 2024-11-13 PROCEDURE — 76816 OB US FOLLOW-UP PER FETUS: CPT

## 2024-12-24 ENCOUNTER — APPOINTMENT (OUTPATIENT)
Dept: ANTEPARTUM | Facility: CLINIC | Age: 29
End: 2024-12-24
Payer: MEDICAID

## 2024-12-24 ENCOUNTER — ASOB RESULT (OUTPATIENT)
Age: 29
End: 2024-12-24

## 2024-12-24 ENCOUNTER — OUTPATIENT (OUTPATIENT)
Dept: OUTPATIENT SERVICES | Facility: HOSPITAL | Age: 29
LOS: 1 days | Discharge: HOME | End: 2024-12-24
Payer: MEDICAID

## 2024-12-24 VITALS
RESPIRATION RATE: 16 BRPM | TEMPERATURE: 99 F | SYSTOLIC BLOOD PRESSURE: 111 MMHG | HEART RATE: 108 BPM | DIASTOLIC BLOOD PRESSURE: 60 MMHG

## 2024-12-24 VITALS — DIASTOLIC BLOOD PRESSURE: 63 MMHG | HEART RATE: 82 BPM | SYSTOLIC BLOOD PRESSURE: 109 MMHG

## 2024-12-24 DIAGNOSIS — O26.899 OTHER SPECIFIED PREGNANCY RELATED CONDITIONS, UNSPECIFIED TRIMESTER: ICD-10-CM

## 2024-12-24 LAB
APPEARANCE UR: CLEAR — SIGNIFICANT CHANGE UP
BILIRUB UR-MCNC: NEGATIVE — SIGNIFICANT CHANGE UP
COLOR SPEC: YELLOW — SIGNIFICANT CHANGE UP
DIFF PNL FLD: NEGATIVE — SIGNIFICANT CHANGE UP
GLUCOSE UR QL: NEGATIVE MG/DL — SIGNIFICANT CHANGE UP
KETONES UR-MCNC: NEGATIVE MG/DL — SIGNIFICANT CHANGE UP
LEUKOCYTE ESTERASE UR-ACNC: NEGATIVE — SIGNIFICANT CHANGE UP
NITRITE UR-MCNC: NEGATIVE — SIGNIFICANT CHANGE UP
PH UR: 7 — SIGNIFICANT CHANGE UP (ref 5–8)
PROT UR-MCNC: NEGATIVE MG/DL — SIGNIFICANT CHANGE UP
SP GR SPEC: 1.01 — SIGNIFICANT CHANGE UP (ref 1–1.03)
UROBILINOGEN FLD QL: 1 MG/DL — SIGNIFICANT CHANGE UP (ref 0.2–1)

## 2024-12-24 PROCEDURE — 76817 TRANSVAGINAL US OBSTETRIC: CPT | Mod: 26

## 2024-12-24 PROCEDURE — 59025 FETAL NON-STRESS TEST: CPT | Mod: 26

## 2024-12-24 PROCEDURE — 76819 FETAL BIOPHYS PROFIL W/O NST: CPT | Mod: 26

## 2024-12-24 PROCEDURE — 99221 1ST HOSP IP/OBS SF/LOW 40: CPT | Mod: 25

## 2024-12-24 PROCEDURE — 83986 ASSAY PH BODY FLUID NOS: CPT | Mod: QW

## 2024-12-24 RX ORDER — PNV/FERROUS FUM/DOCUSATE/FOLIC 90-50-1MG
1 TABLET, EXTENDED RELEASE ORAL
Refills: 0 | DISCHARGE

## 2024-12-24 RX ORDER — FERROUS SULFATE 325(65) MG
0 TABLET ORAL
Refills: 0 | DISCHARGE

## 2024-12-24 NOTE — OB PROVIDER TRIAGE NOTE - ADDITIONAL INSTRUCTIONS
Follow up with in OB office within 1 week. Continue to eat a regular diet and drink plenty of fluid. Return to hospital if you experience cramping, contractions, leaking of vaginal fluid, vaginal bleeding, or a decrease/absence of your baby's movements.

## 2024-12-24 NOTE — OB PROVIDER TRIAGE NOTE - NSHPLABSRESULTS_GEN_ALL_CORE
Urinalysis Basic - ( 24 Dec 2024 21:15 )    Color: Yellow / Appearance: Clear / S.008 / pH: x  Gluc: x / Ketone: Negative mg/dL  / Bili: Negative / Urobili: 1.0 mg/dL   Blood: x / Protein: Negative mg/dL / Nitrite: Negative   Leuk Esterase: Negative / RBC: x / WBC x   Sq Epi: x / Non Sq Epi: x / Bacteria: x

## 2024-12-24 NOTE — OB PROVIDER TRIAGE NOTE - NS_OBGYNHISTORY_OBGYN_ALL_OB_FT
OBHx   Top x1 with D&C     GYNHx   Denies history of fibroids, ovarian cysts, abnormal pap smears, STDs

## 2024-12-24 NOTE — OB PROVIDER TRIAGE NOTE - NSHPPHYSICALEXAM_GEN_ALL_CORE
T(C): 37.0 (12-24-24 @ 21:14), Max: 37 (12-24-24 @ 21:06)  HR: 82 (12-24-24 @ 21:47) (82 - 108)  BP: 109/63 (12-24-24 @ 21:47) (109/63 - 116/67)  RR: 16 (12-24-24 @ 21:14) (16 - 16)    Physical Exam  Gen: NAD  Cardiac: RRR  Pulm: Unlabored, breathing comfortably on room air  Abdomen: soft, nontender, gravid    TAUS: cephalic, Posterior placenta, JAZIEL 15.58 , BPP 8/8 ,  bpm via m-mode, images saved in ASOB  TVUS: cervical length 4.04cm, no funneling or dynamic changes, images saved in ASOB  SSE: Negative pooling, Negative Nitrazine, Negative Fern, patient asked to cough no pooling or dynamic changes noted   VE: 0/0/-3  EFM: 140bpm/ moderate variability/ +accels, no decels/ Reactive NST   [place]: No contractions

## 2024-12-24 NOTE — OB PROVIDER TRIAGE NOTE - NSOBPROVIDERNOTE_OBGYN_ALL_OB_FT
27y/o  @28.1 wks gestation encounter for lower abdominal pain and vaginal leaking    -Rupture of membranes ruled out   - labor ruled out   -Fetal status reassuring with Cat I tracing and BPP   -Patient verbalizes pain has decreased greatly since earlier in the day now 2/10  -Patient has been cleared for discharge home   -Verbal and written discharge instructions provided   -Patient instructed to return to triage if experiencing vaginal leaking, vaginal bleeding, contraction pain, headache, chest pain, blurred vision, dizziness, SOB   -Patient has scheduled appointment for 24    D/W Dr. Kwaku Odonnell, NP

## 2024-12-24 NOTE — OB PROVIDER TRIAGE NOTE - HISTORY OF PRESENT ILLNESS
27y/o  @28.1wks gestation presents with reports of lower abdominal cramping that started 3 days ago.  Patient states pain was more intense today but is now 2/10 on pain scale.  Patient also states she is unsure if she is having excess discharge or if she is leaking fluid.  Denies vaginal bleeding, headache, chest pain, epigastric pain, blurred vision, dizziness, SOB.  Endorses +FM     PNC: Dr. Bach, low risk   [facility] sonogram 24: Cephalic, posterior placenta, no previa, JAZIEL: WNL, MVP: 4.5, Anatomy scan WNL, Sickle cell trait, FOB neg

## 2024-12-24 NOTE — OB PROVIDER TRIAGE NOTE - NS_FETALMOVEMENT_OBGYN_ALL_OB
Lab Results   Component Value Date    LVEF 50 07/14/2024    BNP 1,091 (H) 08/06/2024    BNP 1,428 (H) 07/24/2024       Presents with increased shortness of breath, dyspnea on exertion, lower extremity edema, and cough with productive sputum  Patient is currently volume overloaded.  3 to 4-day history of progressively worsening dyspnea, dry cough, worsening lower extremity edema.  She required increased oxygen when EMS arrived. difficulty talking normal sentences secondary to shortness of breath.  She is now unable to lie flat.  She was discharged on recent admission with 10 mg torsemide daily  Signs and symptoms indicative of acute CHF exacerbation secondary to under diuresis.  Patient is on 2 L of oxygen, presents with increased work of breathing, dry cough, + hepatojular reflux, appears volume overloaded.    Acute and chronic respiratory failure with hypoxia 2/2 CHF exacerbation a/e/b hypoxia, increased oxygen demands requiring CXR, Nebs, IV Lasix and 4L NC oxygen.       Findings: ED provider: She is at baseline on 2 L O2 supplemental O2 and, because of hypoxia to the 80s, was bumped up to 4.  On arrival 97% on 4L (baseline chronic 2L NC)    Plan:  GDMT:  Diuretic: iv lasix 40mg BID, B-Blocker: Toprol  Cardiac diet, sodium restriction  CMP, magnesium tomorrow a.m; Goal Mg > 2 and K > 4; Replete prn  Daily standing weights, Measure I/O   Consult cardiology   Echo 50% LVEF     Wt Readings from Last 3 Encounters:   08/07/24 73.3 kg (161 lb 9.6 oz)   07/29/24 72 kg (158 lb 12.8 oz)   07/28/24 71.9 kg (158 lb 8.2 oz)              Present, unchanged

## 2024-12-25 PROBLEM — R11.10 VOMITING, UNSPECIFIED: Chronic | Status: INACTIVE | Noted: 2024-10-16 | Resolved: 2024-12-24

## 2024-12-25 PROBLEM — Z78.9 OTHER SPECIFIED HEALTH STATUS: Chronic | Status: INACTIVE | Noted: 2024-09-21 | Resolved: 2024-12-24

## 2024-12-25 PROBLEM — K21.9 GASTRO-ESOPHAGEAL REFLUX DISEASE WITHOUT ESOPHAGITIS: Chronic | Status: INACTIVE | Noted: 2024-10-16 | Resolved: 2024-12-24

## 2024-12-26 DIAGNOSIS — O47.03 FALSE LABOR BEFORE 37 COMPLETED WEEKS OF GESTATION, THIRD TRIMESTER: ICD-10-CM

## 2024-12-26 DIAGNOSIS — Z03.71 ENCOUNTER FOR SUSPECTED PROBLEM WITH AMNIOTIC CAVITY AND MEMBRANE RULED OUT: ICD-10-CM

## 2024-12-26 DIAGNOSIS — Z3A.28 28 WEEKS GESTATION OF PREGNANCY: ICD-10-CM

## 2025-01-15 ENCOUNTER — APPOINTMENT (OUTPATIENT)
Dept: ANTEPARTUM | Facility: CLINIC | Age: 30
End: 2025-01-15

## 2025-01-15 ENCOUNTER — OUTPATIENT (OUTPATIENT)
Dept: INPATIENT UNIT | Facility: HOSPITAL | Age: 30
LOS: 1 days | Discharge: HOME | End: 2025-01-15
Payer: MEDICAID

## 2025-01-15 VITALS — TEMPERATURE: 98 F | RESPIRATION RATE: 16 BRPM

## 2025-01-15 VITALS — HEART RATE: 93 BPM | SYSTOLIC BLOOD PRESSURE: 88 MMHG | DIASTOLIC BLOOD PRESSURE: 54 MMHG

## 2025-01-15 DIAGNOSIS — Z36.9 ENCOUNTER FOR ANTENATAL SCREENING, UNSPECIFIED: ICD-10-CM

## 2025-01-15 DIAGNOSIS — O26.899 OTHER SPECIFIED PREGNANCY RELATED CONDITIONS, UNSPECIFIED TRIMESTER: ICD-10-CM

## 2025-01-15 LAB
APPEARANCE UR: CLEAR — SIGNIFICANT CHANGE UP
BACTERIA # UR AUTO: ABNORMAL /HPF
BILIRUB UR-MCNC: NEGATIVE — SIGNIFICANT CHANGE UP
CAST: 0 /LPF — SIGNIFICANT CHANGE UP (ref 0–4)
COLOR SPEC: YELLOW — SIGNIFICANT CHANGE UP
DIFF PNL FLD: NEGATIVE — SIGNIFICANT CHANGE UP
GLUCOSE UR QL: NEGATIVE MG/DL — SIGNIFICANT CHANGE UP
KETONES UR-MCNC: ABNORMAL MG/DL
LEUKOCYTE ESTERASE UR-ACNC: NEGATIVE — SIGNIFICANT CHANGE UP
NITRITE UR-MCNC: NEGATIVE — SIGNIFICANT CHANGE UP
PH UR: 7.5 — SIGNIFICANT CHANGE UP (ref 5–8)
PROT UR-MCNC: NEGATIVE MG/DL — SIGNIFICANT CHANGE UP
RBC CASTS # UR COMP ASSIST: 0 /HPF — SIGNIFICANT CHANGE UP (ref 0–4)
REVIEW: SIGNIFICANT CHANGE UP
SP GR SPEC: 1.01 — SIGNIFICANT CHANGE UP (ref 1–1.03)
SQUAMOUS # UR AUTO: 3 /HPF — SIGNIFICANT CHANGE UP (ref 0–5)
UROBILINOGEN FLD QL: 0.2 MG/DL — SIGNIFICANT CHANGE UP (ref 0.2–1)
WBC UR QL: 0 /HPF — SIGNIFICANT CHANGE UP (ref 0–5)

## 2025-01-15 PROCEDURE — 76815 OB US LIMITED FETUS(S): CPT | Mod: 26

## 2025-01-15 PROCEDURE — 99221 1ST HOSP IP/OBS SF/LOW 40: CPT | Mod: 25

## 2025-01-15 PROCEDURE — 59025 FETAL NON-STRESS TEST: CPT | Mod: 26

## 2025-01-15 PROCEDURE — 76817 TRANSVAGINAL US OBSTETRIC: CPT | Mod: 26

## 2025-01-15 PROCEDURE — 76819 FETAL BIOPHYS PROFIL W/O NST: CPT | Mod: 26

## 2025-01-15 NOTE — OB PROVIDER TRIAGE NOTE - NS_VAGBLEEDCOLOR_OBGYN_ALL_OB
DATE OF PROCEDURE:  01/03/2018    PREOPERATIVE DIAGNOSIS:  Right rotator cuff tear.    POSTOPERATIVE DIAGNOSIS:  Right rotator cuff tear.    PROCEDURES PERFORMED:  1.  Right shoulder arthroscopy.  2.  Rotator cuff repair.  3.  Debridement of the rotator cuff.    SURGEON:  Christine Simpson M.D.    ASSISTANT:  Sacha Arriaga M.D. (RES)    FLUIDS:  LR.    ANESTHESIA:  General.    BLOOD LOSS:  Minimal.    COMPLICATIONS:  None.    IMPLANTS:  One Arthrex anchor.    INDICATION FOR PROCEDURE:  This is a pleasant 76-year-old male who was seen in   clinic complaining of right shoulder pain.  He had failed conservative measure   since his therapy.  Imaging was obtained showing a rotator cuff tear.  He also   had previous surgeries on his shoulder.  Risks and benefits of surgery were   discussed in detail with the patient and he wished to proceed with surgery.    Consents were signed.    PROCEDURE IN DETAIL:  He was seen in the preoperative holding area.  Risks and   benefits were again discussed in detail and he wished to proceed.  He was marked   in the Operating Room and placed on a beach chair.  All bony prominences were   well padded.  The right upper extremity was prepped and draped in normal sterile   fashion.  A timeout was taken.  Both surgeon, site, side, preoperative   antibiotics and surgery were all agreed upon and proceeded.  An 18-gauge needle   was placed through the posterior portal, which was 1 cm distal and 1 cm medial   from the posterolateral aspect of the acromion.  Needle was placed into the   glenohumeral joint.  A 30 mL of fluid was then placed.  Next a stab incision was   performed followed by inner trocar and a camera.  Camera was then inserted.  We   then inserted under direct visualization the anterior trocar, which was in the   rotator interval.  Then, examination of his shoulder was performed.  There were   noted to be a significant tear on the rotator cuff.  Biceps tendon was not   present.   There were some grade III changes in the glenohumeral joint and some   labral fraying.  We took the shaver, cleaned out the labrum.  We then proceeded   to clean the underside of the rotator cuff, which we could see a significant   tear.  After this was performed, we turned our attention to the subacromial   space.   We placed the trocar in the first, followed by direct visualization, a   lateral 18-gauge needle was placed just distal to the acromion and then shaver.    A subacromial bursectomy was performed.  We then noted that the patient had os   acromion.  No subacromial decompression was performed secondary to the patient's   acromion being quite thin and the os acromiale being present.  After this, we   turned our attention to the rotator cuff.  It was debrided.  The rotator cuff   was then sutured in a horizontal mattress-type fashion with FiberWire brought   over.  We then put another FiberTape in the middle and pulled it over.  It   mobilized all the way over to the insertion.  Insertion was then cleaned.  A   mallet was used, followed by an anchor with the sutures placed through.  After   the anchor was placed, ROM was performed showing good coverage and adequate   reduction of the rotator cuff.  Due to the tear and the size and the frailness   of the rotator cuff, we decided to proceed with a patch.  A 4 cm incision was   made over the lateral portal and extended.  Care was taken not to damage the   axillary nerve.  Dissection down to the deltoid was performed, which was then   split.  We then placed an anchor followed by a patch, which was placed into the   superior part of the cuff to provide more support and the inferior part of the   patch was placed into the anchor.  After being tied down, sutures were cut.  We   then closed with 0 Vicryl followed by 3-0 Vicryl and followed by 4-0 Prolene.    All the scope portals were closed with 4-0 Vicryl followed by 4-0 Prolene.  A   sterile dressing was applied.   The patient tolerated the procedure without any   complication and wheeled into the PACU in stable condition.    PLAN FOR THIS PATIENT:  The patient will follow the rotator cuff repair protocol   per Dr. Cutler.  Dr. Cutler was present for all the key portions of the case.      DICTATED BY:  Sacha Arriaga M.D. (RES)      BC/SAUL  dd: 01/03/2018 17:05:10 (CST)  td: 01/03/2018 20:44:06 (CST)  Doc ID   #[id number]  Job ID #[id number]    CC:     [id number]   N/A

## 2025-01-15 NOTE — OB PROVIDER TRIAGE NOTE - NSHPLABSRESULTS_GEN_ALL_CORE
urinalysis urinalysis  Urinalysis Basic - ( 15 Bhupinder 2025 15:54 )    Color: Yellow / Appearance: Clear / S.013 / pH: x  Gluc: x / Ketone: Trace mg/dL  / Bili: Negative / Urobili: 0.2 mg/dL   Blood: x / Protein: Negative mg/dL / Nitrite: Negative   Leuk Esterase: Negative / RBC: x / WBC x   Sq Epi: x / Non Sq Epi: x / Bacteria: x

## 2025-01-15 NOTE — OB PROVIDER TRIAGE NOTE - NSHPPHYSICALEXAM_GEN_ALL_CORE
abdomen: soft, nt on palp  no guarding no rebound tenderness noted   SSE cervix appears closed and posterior   sono reports in ASOB   sono images in ASOB   TVS: 4.0 cm   TAS BPP: 8/8 vtx posterior placenta JAZIEL: 18.08  T(C): 36.8 (01-15-25 @ 14:33), Max: 36.8 (01-15-25 @ 14:20)  HR: 96 (01-15-25 @ 16:07) (86 - 109)  BP: 92/50 (01-15-25 @ 16:07) (92/50 - 116/66)  RR: 16 (01-15-25 @ 14:33) (16 - 16)  SpO2: --  NST reactive  FH baseline 140 FH variability mod +FH accels no FH decels  toco: no uterine contractions noted

## 2025-01-15 NOTE — OB PROVIDER TRIAGE NOTE - NSOBPROVIDERNOTE_OBGYN_ALL_OB_FT
28 y/o  @ 31.2 wks gestation presents with c/o increased pelvic pressure :  no evidence of PTL   maternal and fetal status reassuring   likely with normal discomforts of pregnancy   plan of care d/w dr hooper  pt to be discharged home   discharge   PTL precautions d/w pt  increase fluid intake  instructed on fetal kickcounts   follow up with [facility] as sched   pt may use abdominal binder if needed prn   w/v discharge instructions given  discharged home     discharge @ [phone]

## 2025-01-15 NOTE — OB PROVIDER TRIAGE NOTE - HISTORY OF PRESENT ILLNESS
PNC with [facility]   28 y/o  @ 31.2 wks gestation presents with c/o increased pelvic pressure since this AM states the pressure is while she is lying sitting or standing denies any hematuria or dysuria denies any recent intercourse denies any LOF or VB reports +FM denies any n/v/d denies any fever or chills ap care comp by :   hyperemesis   anemia

## 2025-01-20 DIAGNOSIS — Z3A.31 31 WEEKS GESTATION OF PREGNANCY: ICD-10-CM

## 2025-01-20 DIAGNOSIS — D64.9 ANEMIA, UNSPECIFIED: ICD-10-CM

## 2025-01-20 DIAGNOSIS — O21.2 LATE VOMITING OF PREGNANCY: ICD-10-CM

## 2025-01-20 DIAGNOSIS — O47.03 FALSE LABOR BEFORE 37 COMPLETED WEEKS OF GESTATION, THIRD TRIMESTER: ICD-10-CM

## 2025-01-20 DIAGNOSIS — O99.013 ANEMIA COMPLICATING PREGNANCY, THIRD TRIMESTER: ICD-10-CM

## 2025-02-11 ENCOUNTER — APPOINTMENT (OUTPATIENT)
Dept: GASTROENTEROLOGY | Facility: CLINIC | Age: 30
End: 2025-02-11

## 2025-02-13 ENCOUNTER — APPOINTMENT (OUTPATIENT)
Dept: HEPATOLOGY | Facility: CLINIC | Age: 30
End: 2025-02-13

## 2025-02-17 ENCOUNTER — ASOB RESULT (OUTPATIENT)
Age: 30
End: 2025-02-17

## 2025-02-17 ENCOUNTER — OUTPATIENT (OUTPATIENT)
Dept: INPATIENT UNIT | Facility: HOSPITAL | Age: 30
LOS: 1 days | Discharge: HOME | End: 2025-02-17
Payer: MEDICAID

## 2025-02-17 ENCOUNTER — APPOINTMENT (OUTPATIENT)
Dept: ANTEPARTUM | Facility: CLINIC | Age: 30
End: 2025-02-17
Payer: MEDICAID

## 2025-02-17 VITALS
HEART RATE: 93 BPM | SYSTOLIC BLOOD PRESSURE: 118 MMHG | RESPIRATION RATE: 14 BRPM | TEMPERATURE: 98 F | DIASTOLIC BLOOD PRESSURE: 74 MMHG

## 2025-02-17 VITALS — DIASTOLIC BLOOD PRESSURE: 74 MMHG | HEART RATE: 93 BPM | SYSTOLIC BLOOD PRESSURE: 122 MMHG

## 2025-02-17 DIAGNOSIS — O26.899 OTHER SPECIFIED PREGNANCY RELATED CONDITIONS, UNSPECIFIED TRIMESTER: ICD-10-CM

## 2025-02-17 DIAGNOSIS — Z98.890 OTHER SPECIFIED POSTPROCEDURAL STATES: Chronic | ICD-10-CM

## 2025-02-17 LAB
APPEARANCE UR: CLEAR — SIGNIFICANT CHANGE UP
BILIRUB UR-MCNC: NEGATIVE — SIGNIFICANT CHANGE UP
COLOR SPEC: YELLOW — SIGNIFICANT CHANGE UP
DIFF PNL FLD: NEGATIVE — SIGNIFICANT CHANGE UP
GLUCOSE UR QL: NEGATIVE MG/DL — SIGNIFICANT CHANGE UP
KETONES UR-MCNC: NEGATIVE MG/DL — SIGNIFICANT CHANGE UP
LEUKOCYTE ESTERASE UR-ACNC: NEGATIVE — SIGNIFICANT CHANGE UP
NITRITE UR-MCNC: NEGATIVE — SIGNIFICANT CHANGE UP
PH UR: 6.5 — SIGNIFICANT CHANGE UP (ref 5–8)
PROT UR-MCNC: NEGATIVE MG/DL — SIGNIFICANT CHANGE UP
SP GR SPEC: 1.01 — SIGNIFICANT CHANGE UP (ref 1–1.03)
UROBILINOGEN FLD QL: 0.2 MG/DL — SIGNIFICANT CHANGE UP (ref 0.2–1)

## 2025-02-17 PROCEDURE — 59025 FETAL NON-STRESS TEST: CPT | Mod: 26

## 2025-02-17 PROCEDURE — 99221 1ST HOSP IP/OBS SF/LOW 40: CPT | Mod: 25

## 2025-02-17 PROCEDURE — 76819 FETAL BIOPHYS PROFIL W/O NST: CPT | Mod: 26

## 2025-02-17 NOTE — OB PROVIDER TRIAGE NOTE - NSHPPHYSICALEXAM_GEN_ALL_CORE
Vital Signs Last 24 Hrs  T(C): 36.6 (17 Feb 2025 00:29), Max: 36.6 (17 Feb 2025 00:29)  T(F): 97.9 (17 Feb 2025 00:29), Max: 97.9 (17 Feb 2025 00:29)  HR: 93 (17 Feb 2025 00:54) (93 - 93)  BP: 122/74 (17 Feb 2025 00:54) (118/74 - 122/74)  BP(mean): --  RR: 14 (17 Feb 2025 00:29) (14 - 14)  SpO2: --    gen: a/o x 3, NAD  pulm: breathing unlabored on room air  abd: soft and nontender, gravid. no rebound/guarding noted. neg CVA tenderness   SSE: deferred   SVE: 0/0/-3, no VB/LOF noted   TAS: vertex by sono. posterior placenta. JAZIEL 15. GFM. practice breathing visualized.  JAZIEL 15. BPP 8/8. image saved in ASOB  EFM: baseline 140 with mod variability, pos accels- reactive   TOCO: irregular contractions Vital Signs Last 24 Hrs  T(C): 36.6 (17 Feb 2025 00:29), Max: 36.6 (17 Feb 2025 00:29)  T(F): 97.9 (17 Feb 2025 00:29), Max: 97.9 (17 Feb 2025 00:29)  HR: 93 (17 Feb 2025 00:54) (93 - 93)  BP: 122/74 (17 Feb 2025 00:54) (118/74 - 122/74)  BP(mean): --  RR: 14 (17 Feb 2025 00:29) (14 - 14)  SpO2: --    gen: a/o x 3, NAD  pulm: breathing unlabored on room air  abd: soft and nontender, gravid. no rebound/guarding noted. neg CVA tenderness   SSE: deferred   SVE: 0/0/-3, no VB/LOF noted. Supervised by GUILLERMO Bustamante RN.   TAS: vertex by sono. posterior placenta. JAZIEL 15. GFM. practice breathing visualized.  JAZIEL 15. BPP 8/8. image saved in ASOB  EFM: baseline 140 with mod variability, pos accels- reactive   TOCO: irregular contractions

## 2025-02-17 NOTE — OB PROVIDER TRIAGE NOTE - NSHPLABSRESULTS_GEN_ALL_CORE
Urinalysis Basic - ( 2025 00:52 )    Color: Yellow / Appearance: Clear / S.007 / pH: x  Gluc: x / Ketone: Negative mg/dL  / Bili: Negative / Urobili: 0.2 mg/dL   Blood: x / Protein: Negative mg/dL / Nitrite: Negative   Leuk Esterase: Negative / RBC: x / WBC x   Sq Epi: x / Non Sq Epi: x / Bacteria: x

## 2025-02-17 NOTE — OB PROVIDER TRIAGE NOTE - ADDITIONAL INSTRUCTIONS
follow up with OB provider as scheduled this week.  increase fluid hydration.  reviewed fetal kick count and labor precautions  call OB/return to triage with change in symptoms and or concerns such as decreased fetal movement, leakage of fluid, vaginal bleeding, contractions, pain, fever, headache, blurry vision.

## 2025-02-17 NOTE — OB RN TRIAGE NOTE - FALL HARM RISK - UNIVERSAL INTERVENTIONS
Bed in lowest position, wheels locked, appropriate side rails in place/Call bell, personal items and telephone in reach/Instruct patient to call for assistance before getting out of bed or chair/Non-slip footwear when patient is out of bed/[place] to call system/Physically safe environment - no spills, clutter or unnecessary equipment/Purposeful Proactive Rounding/Room/bathroom lighting operational, light cord in reach

## 2025-02-17 NOTE — OB PROVIDER TRIAGE NOTE - NSOBPROVIDERNOTE_OBGYN_ALL_OB_FT
29 year old female  @ 36GA with SLIUP presents to triage c/o contractions    0/0/-3  TAS vertex, posterior, JAZIEL 15, BPP 8/8  NST reactive  TOCO irregular contractions  Pt defers pain meds, pt verbalizes feeling comfortable to go home     maternal/fetal surveillance reassuring  d/w Dr Holman: discharge home  follow up with OB provider as scheduled this week.  increase fluid hydration.  reviewed fetal kick count and labor precautions  call OB/return to triage with change in symptoms and or concerns such as decreased fetal movement, leakage of fluid, vaginal bleeding, contractions, pain, fever, headache, blurry vision.  addressed questions and concerns  reviewed labor precautions and discharge papers. pt verbalized understanding and signed.    Discharged 2:15am 29 year old female  @ 36GA with SLIUP presents to triage c/o contractions  UA sent     0/0/-3  TAS - bedside sonogram: vertex, posterior, JAZIEL 15, BPP 8/8  NST reactive  TOCO irregular contractions  Pt defers pain meds, pt verbalizes feeling comfortable to go home     UA neg    ruled out labor, ruled out UTI  maternal/fetal surveillance reassuring  d/w Dr Holman: discharge home  follow up with OB provider as scheduled this week 25.  increase fluid hydration.  reviewed fetal kick count and labor precautions  call OB/return to triage with change in symptoms and or concerns such as decreased fetal movement, leakage of fluid, vaginal bleeding, contractions, pain, fever, headache, blurry vision.  addressed questions and concerns  reviewed labor precautions and discharge papers. pt verbalized understanding and signed.    Discharged 2:15am

## 2025-02-17 NOTE — OB PROVIDER TRIAGE NOTE - NS_FETALPRESSONO_OBGYN_ALL_OB
sent message via [employer] in regards to appointment change , labs needing to be drawn as well as being unable to reach patient at number ending in [phone].    Cephalic

## 2025-02-17 NOTE — OB PROVIDER TRIAGE NOTE - HISTORY OF PRESENT ILLNESS
29 year old female  @ 36GA with SLIUP presents to triage c/o contractions. Pt reports feeling intermittent cramping since 10pm, rated 4/10 in pain, defers pain meds. Reports lower pelvic pressure.  reports GFM. denies vaginal bleeding, leakage of fluid.   Denies fever, chills, headaches, changes in vision, chest pain, palpitations, shortness of breath, cough, nausea, vomiting, diarrhea, constipation, urinary symptoms, edema.     PNC with [facility]   - last visit 2 weeks ago   - next visit 25  PNC complications:   1. anemia, s/p iron infusion x 1   2. hyperemesis, resolved   3. transaminitis - follows with OB outpatient    4. anxiety - pt with anxiety 2/2 hyperemesis in early pregnancy, was scheduled to for D&E but changed her mind last minute. pt denies suicidal ideations/actions/thoughts    29 year old female  @ 36GA with SLIUP presents to triage c/o contractions. Pt reports feeling intermittent cramping since 10pm, rated 4/10 in pain, defers pain meds. Reports lower pelvic pressure.  reports GFM. denies vaginal bleeding, leakage of fluid.   Denies fever, chills, headaches, changes in vision, chest pain, palpitations, shortness of breath, cough, nausea, vomiting, diarrhea, constipation, urinary symptoms, edema.     PNC with [facility]   - last visit 2 weeks ago   - next visit 25  PNC complications:   1. anemia, s/p iron infusion x 1   2. hyperemesis, resolved   3. transaminitis - follows with OB outpatient    4. anxiety - pt with anxiety 2/2 hyperemesis in early pregnancy, was scheduled for D&E but changed her mind last minute. pt denies suicidal ideations/actions/thoughts

## 2025-02-17 NOTE — OB RN TRIAGE NOTE - PAIN RATING/NUMBER SCALE (0-10): ACTIVITY
Pst hypothermia  EEG with burst suppression  MRI with diffuse anoxic injury x2 scans  Family coming in today for prognostication and ongoing goals of care   4 (moderate pain)

## 2025-02-19 ENCOUNTER — INPATIENT (INPATIENT)
Facility: HOSPITAL | Age: 30
LOS: 0 days | Discharge: HOME | End: 2025-02-20
Attending: STUDENT IN AN ORGANIZED HEALTH CARE EDUCATION/TRAINING PROGRAM | Admitting: STUDENT IN AN ORGANIZED HEALTH CARE EDUCATION/TRAINING PROGRAM

## 2025-02-19 ENCOUNTER — APPOINTMENT (OUTPATIENT)
Dept: ANTEPARTUM | Facility: CLINIC | Age: 30
End: 2025-02-19

## 2025-02-19 DIAGNOSIS — F41.9 ANXIETY DISORDER, UNSPECIFIED: ICD-10-CM

## 2025-02-19 DIAGNOSIS — O47.03 FALSE LABOR BEFORE 37 COMPLETED WEEKS OF GESTATION, THIRD TRIMESTER: ICD-10-CM

## 2025-02-19 DIAGNOSIS — O26.893 OTHER SPECIFIED PREGNANCY RELATED CONDITIONS, THIRD TRIMESTER: ICD-10-CM

## 2025-02-19 DIAGNOSIS — O99.013 ANEMIA COMPLICATING PREGNANCY, THIRD TRIMESTER: ICD-10-CM

## 2025-02-19 DIAGNOSIS — Z98.890 OTHER SPECIFIED POSTPROCEDURAL STATES: Chronic | ICD-10-CM

## 2025-02-19 DIAGNOSIS — Z3A.36 36 WEEKS GESTATION OF PREGNANCY: ICD-10-CM

## 2025-02-19 DIAGNOSIS — R74.01 ELEVATION OF LEVELS OF LIVER TRANSAMINASE LEVELS: ICD-10-CM

## 2025-02-19 DIAGNOSIS — R80.9 PROTEINURIA, UNSPECIFIED: ICD-10-CM

## 2025-02-19 DIAGNOSIS — D64.9 ANEMIA, UNSPECIFIED: ICD-10-CM

## 2025-02-19 DIAGNOSIS — O26.899 OTHER SPECIFIED PREGNANCY RELATED CONDITIONS, UNSPECIFIED TRIMESTER: ICD-10-CM

## 2025-02-19 DIAGNOSIS — O99.343 OTHER MENTAL DISORDERS COMPLICATING PREGNANCY, THIRD TRIMESTER: ICD-10-CM

## 2025-02-20 ENCOUNTER — OUTPATIENT (OUTPATIENT)
Dept: INPATIENT UNIT | Facility: HOSPITAL | Age: 30
LOS: 1 days | Discharge: HOME | End: 2025-02-20
Payer: MEDICAID

## 2025-02-20 ENCOUNTER — APPOINTMENT (OUTPATIENT)
Dept: ANTEPARTUM | Facility: CLINIC | Age: 30
End: 2025-02-20

## 2025-02-20 VITALS
DIASTOLIC BLOOD PRESSURE: 65 MMHG | TEMPERATURE: 98 F | HEART RATE: 97 BPM | RESPIRATION RATE: 18 BRPM | SYSTOLIC BLOOD PRESSURE: 107 MMHG

## 2025-02-20 VITALS
SYSTOLIC BLOOD PRESSURE: 125 MMHG | RESPIRATION RATE: 16 BRPM | TEMPERATURE: 98 F | HEART RATE: 95 BPM | DIASTOLIC BLOOD PRESSURE: 70 MMHG

## 2025-02-20 VITALS — SYSTOLIC BLOOD PRESSURE: 109 MMHG | HEART RATE: 85 BPM | DIASTOLIC BLOOD PRESSURE: 57 MMHG

## 2025-02-20 DIAGNOSIS — Z98.890 OTHER SPECIFIED POSTPROCEDURAL STATES: Chronic | ICD-10-CM

## 2025-02-20 DIAGNOSIS — O26.899 OTHER SPECIFIED PREGNANCY RELATED CONDITIONS, UNSPECIFIED TRIMESTER: ICD-10-CM

## 2025-02-20 DIAGNOSIS — O36.8390 MATERNAL CARE FOR ABNORMALITIES OF THE FETAL HEART RATE OR RHYTHM, UNSPECIFIED TRIMESTER, NOT APPLICABLE OR UNSPECIFIED: ICD-10-CM

## 2025-02-20 LAB — AMNISURE ROM (RUPTURE OF MEMBRANES): NEGATIVE — SIGNIFICANT CHANGE UP

## 2025-02-20 PROCEDURE — 83986 ASSAY PH BODY FLUID NOS: CPT | Mod: QW

## 2025-02-20 PROCEDURE — 99221 1ST HOSP IP/OBS SF/LOW 40: CPT | Mod: 25

## 2025-02-20 PROCEDURE — 59025 FETAL NON-STRESS TEST: CPT | Mod: 26

## 2025-02-20 RX ORDER — SODIUM CHLORIDE 9 G/1000ML
1000 INJECTION, SOLUTION INTRAVENOUS ONCE
Refills: 0 | Status: COMPLETED | OUTPATIENT
Start: 2025-02-20 | End: 2025-02-20

## 2025-02-20 RX ADMIN — SODIUM CHLORIDE 1000 MILLILITER(S): 9 INJECTION, SOLUTION INTRAVENOUS at 01:00

## 2025-02-20 NOTE — OB PROVIDER TRIAGE NOTE - NS_OBGYNHISTORY_OBGYN_ALL_OB_FT
OBHx   Ectopic ( left)  x 1 with D&E 2020    GYNHx   Denies history of abnormal pap smear, fibroids, ovarian cysts, STDs

## 2025-02-20 NOTE — OB PROVIDER TRIAGE NOTE - HISTORY OF PRESENT ILLNESS
28y/o  @36.3wks gestation presents with contraction pain and leaking of fluid patient is unsure if it is discharge or she broke her water.  Patient denies vaginal bleeding, headache, chest pain, epigastric pain, blurred vision, dizziness, SOB.  Endorses +FM     PNC: Dr. Bach   Anemia, received iron transfusion x1 dose   MFM sonogram 2025: Cephalic, posterior placenta, no previa, JAZIEL 14.87, BPP 8/8

## 2025-02-20 NOTE — OB PROVIDER TRIAGE NOTE - NS_OBGYNHISTORY_OBGYN_ALL_OB_FT
OBHx   TOP x 1 with D&C    GYNHx   Denies history of abnormal pap smear, fibroids, ovarian cysts, STDs

## 2025-02-20 NOTE — OB PROVIDER TRIAGE NOTE - ADDITIONAL INSTRUCTIONS
Follow up with OB provider within 1 week. Continue to eat a regular diet and drink plenty of fluid. Return to hospital if you experience cramping, contractions, leaking of vaginal fluid, vaginal bleeding, or a decrease/absence of your baby's movements.

## 2025-02-20 NOTE — OB RN TRIAGE NOTE - FALL HARM RISK - UNIVERSAL INTERVENTIONS
Bed in lowest position, wheels locked, appropriate side rails in place/Call bell, personal items and telephone in reach/Instruct patient to call for assistance before getting out of bed or chair/Non-slip footwear when patient is out of bed/[place] to call system/Physically safe environment - no spills, clutter or unnecessary equipment/Purposeful Proactive Rounding/Room/bathroom lighting operational, light cord in reach

## 2025-02-20 NOTE — OB RN TRIAGE NOTE - FALL HARM RISK - UNIVERSAL INTERVENTIONS
Bed in lowest position, wheels locked, appropriate side rails in place/Call bell, personal items and telephone in reach/Instruct patient to call for assistance before getting out of bed or chair/Non-slip footwear when patient is out of bed/[place] to call system/Physically safe environment - no spills, clutter or unnecessary equipment/Purposeful Proactive Rounding/Room/bathroom lighting operational, light cord in reach

## 2025-02-20 NOTE — OB PROVIDER TRIAGE NOTE - NSOBPROVIDERNOTE_OBGYN_ALL_OB_FT
28y/o  @36.3wks gestation encounter for contraction pain and R/O rupture     -Rupture of membrane ruled out   -Fetal status reassuring with Category I tracing and BPP   -Repeat VE unchanged   -Patient moon frequently every 2-4 min, morphine offered, patient declined any pain intervention at this time  -Plan for continued observation and repeat VE @5am  D/W Dr. Sophie Odonnell, NP 30y/o  @36.3wks gestation encounter for contraction pain and R/O rupture     -Rupture of membrane ruled out   -Fetal status reassuring with Category I tracing and BPP   -Repeat VE unchanged   -Patient moon frequently every 2-4 min, morphine offered, patient declined any pain intervention at this time  -Plan for continued observation and repeat VE @5am  D/W Dr. Sophie Odonnell, NP    0500am   VE remains unchanged   Pre term labor ruled out   Patient verbalizes decrease in pain over the last 1 hour   Patient will follow up with OB as discussed   Patient cleared for discharge home   Verbal and written discharge instructions provided   Return to triage if experiencing worsening contraction pain, vaginal leaking, vaginal bleeding, decreased fetal movement, headache, chest pain, blurred vision, dizziness, SOB     D/W Dr. Sophie Odonnell, NP

## 2025-02-20 NOTE — OB PROVIDER TRIAGE NOTE - NS_PELVICEXAM_OBGYN_ALL_OB
Med requested again from [address] [place] [address], [address] [place] [address]  02/26/19 2004 Yes

## 2025-02-20 NOTE — OB PROVIDER TRIAGE NOTE - NSOBPROVIDERNOTE_OBGYN_ALL_OB_FT
30y/o  @36.3 wks gestation encounter for contraction pain and r/o ROM     -Rupture of membranes ruled out with negative Nitrazine, Negative fern and Negative amnisure   -Plan for IV fluids, at this time patient is refusing IV fluids verbalizes she will do oral hydration   -Blood work obtained   -Urinalysis pending   -Plan for repeat vaginal exam in 2 hours 28y/o  @36.3 wks gestation encounter for contraction pain and r/o ROM     -Rupture of membranes ruled out with negative Nitrazine, Negative fern and Negative amnisure   -Plan for IV fluids, at this time patient is refusing IV fluids verbalizes she will do oral hydration   -Blood work obtained   -Urinalysis pending   -Plan for repeat vaginal exam in 2 hours    2330  -Vaginal exam remains unchanged   -Patient Verbalizes no pain 0/10 on pain scale   - 28y/o  @36.3 wks gestation encounter for contraction pain and r/o ROM     -Rupture of membranes ruled out with negative Nitrazine, Negative fern and Negative amnisure   -Plan for IV fluids, at this time patient is refusing IV fluids verbalizes she will do oral hydration   -Blood work obtained   -Urinalysis pending   -Plan for repeat vaginal exam in 2 hours    2330  -Vaginal exam remains unchanged   -Patient Verbalizes no pain 0/10 on pain scale   -Fetal status reassuring with Category I tracing and BPP 8/8  -Patient cleared for discharge home   -Verbal and written discharge instructions provided   -Return to triage if experiencing contraction pain, vaginal leaking, vaginal bleeding, head ache, chest pain, blurred vision, dizziness, SOB, decreased fetal movement   -Patient and spouse verbalize understanding of information given   -Follow up with OB provider within one week     D/W Dr. Júnior Odonnell, NP 30y/o  @36.3 wks gestation encounter for contraction pain and r/o ROM     -Rupture of membranes ruled out with negative Nitrazine, Negative fern and Negative amnisure   -Plan for IV fluids, at this time patient is refusing IV fluids verbalizes she will do oral hydration   -Blood work obtained   -Urinalysis pending   -Plan for repeat vaginal exam in 2 hours    2330  -Vaginal exam remains unchanged   -Patient Verbalizes no pain 0/10 on pain scale   -Fetal status reassuring with Category I tracing and BPP 8/8  -Patient cleared for discharge home   -Verbal and written discharge instructions provided   -Return to triage if experiencing contraction pain, vaginal leaking, vaginal bleeding, head ache, chest pain, blurred vision, dizziness, SOB, decreased fetal movement   -Patient and spouse verbalize understanding of information given   -Follow up with OB provider within one week   Discharged home @0136am   D/W Dr. Júnior Odonnell, NP

## 2025-02-20 NOTE — OB PROVIDER TRIAGE NOTE - HISTORY OF PRESENT ILLNESS
28y/o  @36.3wks gestation presents with contraction pain and leaking of fluid at 6pm.  Patient verbalizes having a strong contraction followed by a gush of fluid.  Contraction pain 4/10 on pain scale.  Denies vaginal bleeding, headache, chest pain, blurred vision, dizziness, SOB.  Endorses +FM     PNC: Dr. Bach, low risk   Anemia, Iron transfusion x1   MFM sonogram 2025: Cephalic, Posterior placenta, JAZIEL 14.87, BPP 8/8

## 2025-02-20 NOTE — OB PROVIDER TRIAGE NOTE - NSHPPHYSICALEXAM_GEN_ALL_CORE
T(C): 36.6 (02-20-25 @ 00:12), Max: 36.6 (02-20-25 @ 00:12)  HR: 96 (02-20-25 @ 00:13) (95 - 96)  BP: 112/75 (02-20-25 @ 00:13) (112/75 - 125/70)  RR: 16 (02-20-25 @ 00:12) (16 - 16)    Physical Exam  Gen: NAD  Cardiac: RRR  Pulm: Unlabored, breathing comfortably on room air  Abdomen: soft, nontender, gravid    TAUS: cephalic, __ placenta, MVP/JAZIEL , BPP , FHR  bpm via m-mode, images saved in ASOB  TVUS: cervical length __cm, no funneling or dynamic changes, images saved in ASOB  SSE:   VE:  EFM:  [place]: T(C): 36.6 (02-20-25 @ 00:12), Max: 36.6 (02-20-25 @ 00:12)  HR: 96 (02-20-25 @ 00:13) (95 - 96)  BP: 112/75 (02-20-25 @ 00:13) (112/75 - 125/70)  RR: 16 (02-20-25 @ 00:12) (16 - 16)    Physical Exam  Gen: NAD  Cardiac: RRR  Pulm: Unlabored, breathing comfortably on room air  Abdomen: soft, nontender, gravid    TAUS: cephalic, posterior placenta, JAZIEL 11.93, BPP 8/8 ,  bpm via m-mode, images saved in ASOB  SSE: Negative pooling, Negative Nitrazine, Negative Fern   VE: 1/50/-3  EFM: 130bpm/ moderate variability/ +accels, no decels/ Reactive NST   [place]: Q2-4min       Repeat VE @0240am  1/50/-3 T(C): 36.6 (02-20-25 @ 00:12), Max: 36.6 (02-20-25 @ 00:12)  HR: 96 (02-20-25 @ 00:13) (95 - 96)  BP: 112/75 (02-20-25 @ 00:13) (112/75 - 125/70)  RR: 16 (02-20-25 @ 00:12) (16 - 16)    Physical Exam  Gen: NAD  Cardiac: RRR  Pulm: Unlabored, breathing comfortably on room air  Abdomen: soft, nontender, gravid    TAUS: cephalic, posterior placenta, JAZIEL 11.93, BPP 8/8 ,  bpm via m-mode, images saved in ASOB  SSE: Negative pooling, Negative Nitrazine, Negative Fern   VE: 1/50/-3  EFM: 130bpm/ moderate variability/ +accels, no decels/ Reactive NST   [place]: Q2-4min       Repeat VE @0240am  1/50/-3    Repeat VE @5am  1/50/-3

## 2025-02-20 NOTE — OB PROVIDER TRIAGE NOTE - ADDITIONAL INSTRUCTIONS
Follow up with OB provider within one week. Continue to eat a regular diet and drink plenty of fluid. Return to hospital if you experience cramping, contractions, leaking of vaginal fluid, vaginal bleeding, or a decrease/absence of your baby's movements.

## 2025-02-20 NOTE — OB PROVIDER TRIAGE NOTE - NSHPPHYSICALEXAM_GEN_ALL_CORE
T(C): 36.7 (02-20-25 @ 21:06), Max: 36.7 (02-20-25 @ 21:06)  HR: 78 (02-20-25 @ 23:05) (78 - 98)  BP: 112/64 (02-20-25 @ 23:05) (107/65 - 125/70)  RR: 17 (02-20-25 @ 21:06) (16 - 18)  SpO2: --    Physical Exam  Gen: NAD  Cardiac: RRR  Pulm: Unlabored, breathing comfortably on room air  Abdomen: soft, nontender, gravid    TAUS: cephalic, __ placenta, MVP/JAZIEL , BPP , FHR  bpm via m-mode, images saved in ASOB  TVUS: cervical length __cm, no funneling or dynamic changes, images saved in ASOB  SSE:   VE:  EFM:  [place]: T(C): 36.7 (02-20-25 @ 21:06), Max: 36.7 (02-20-25 @ 21:06)  HR: 78 (02-20-25 @ 23:05) (78 - 98)  BP: 112/64 (02-20-25 @ 23:05) (107/65 - 125/70)  RR: 17 (02-20-25 @ 21:06) (16 - 18)    Physical Exam  Gen: NAD  Cardiac: RRR  Pulm: Unlabored, breathing comfortably on room air  Abdomen: soft, nontender, gravid    TAUS: cephalic, posterior placenta, JAZIEL 8.62cm,  BPP 8/8,  bpm via m-mode, images saved in ASOB  SSE: Negative Nitrazine, Negative Fern, Negative pooling, Negative amnisure   VE: 1/50/-3  EFM: 135bpm/ moderate variability/ +accels, no decels/ Reactive NST   [place]: Irregular contraction    Repeat VE @2330   1/50/-3

## 2025-02-21 ENCOUNTER — APPOINTMENT (OUTPATIENT)
Dept: CARDIOLOGY | Facility: CLINIC | Age: 30
End: 2025-02-21

## 2025-02-21 ENCOUNTER — ASOB RESULT (OUTPATIENT)
Age: 30
End: 2025-02-21

## 2025-02-21 VITALS — DIASTOLIC BLOOD PRESSURE: 61 MMHG | SYSTOLIC BLOOD PRESSURE: 128 MMHG | HEART RATE: 85 BPM

## 2025-02-21 DIAGNOSIS — Z03.71 ENCOUNTER FOR SUSPECTED PROBLEM WITH AMNIOTIC CAVITY AND MEMBRANE RULED OUT: ICD-10-CM

## 2025-02-21 DIAGNOSIS — D64.9 ANEMIA, UNSPECIFIED: ICD-10-CM

## 2025-02-21 DIAGNOSIS — O09.13 SUPERVISION OF PREGNANCY WITH HISTORY OF ECTOPIC PREGNANCY, THIRD TRIMESTER: ICD-10-CM

## 2025-02-21 DIAGNOSIS — O99.013 ANEMIA COMPLICATING PREGNANCY, THIRD TRIMESTER: ICD-10-CM

## 2025-02-21 DIAGNOSIS — O47.03 FALSE LABOR BEFORE 37 COMPLETED WEEKS OF GESTATION, THIRD TRIMESTER: ICD-10-CM

## 2025-02-21 DIAGNOSIS — Z3A.36 36 WEEKS GESTATION OF PREGNANCY: ICD-10-CM

## 2025-02-21 LAB
ALBUMIN SERPL ELPH-MCNC: 3.3 G/DL — SIGNIFICANT CHANGE UP (ref 3.3–5)
ALP SERPL-CCNC: 147 U/L — HIGH (ref 40–120)
ALT FLD-CCNC: 14 U/L — SIGNIFICANT CHANGE UP (ref 4–33)
ANION GAP SERPL CALC-SCNC: 13 MMOL/L — SIGNIFICANT CHANGE UP (ref 7–14)
ANISOCYTOSIS BLD QL: SLIGHT — SIGNIFICANT CHANGE UP
APPEARANCE UR: CLEAR — SIGNIFICANT CHANGE UP
AST SERPL-CCNC: 20 U/L — SIGNIFICANT CHANGE UP (ref 4–32)
BASOPHILS # BLD AUTO: 0 K/UL — SIGNIFICANT CHANGE UP (ref 0–0.2)
BASOPHILS NFR BLD AUTO: 0 % — SIGNIFICANT CHANGE UP (ref 0–2)
BILIRUB SERPL-MCNC: 0.3 MG/DL — SIGNIFICANT CHANGE UP (ref 0.2–1.2)
BILIRUB UR-MCNC: NEGATIVE — SIGNIFICANT CHANGE UP
BLD GP AB SCN SERPL QL: NEGATIVE — SIGNIFICANT CHANGE UP
BUN SERPL-MCNC: 7 MG/DL — SIGNIFICANT CHANGE UP (ref 7–23)
CALCIUM SERPL-MCNC: 9.1 MG/DL — SIGNIFICANT CHANGE UP (ref 8.4–10.5)
CHLORIDE SERPL-SCNC: 105 MMOL/L — SIGNIFICANT CHANGE UP (ref 98–107)
CO2 SERPL-SCNC: 19 MMOL/L — LOW (ref 22–31)
COLOR SPEC: ABNORMAL
CREAT SERPL-MCNC: 0.52 MG/DL — SIGNIFICANT CHANGE UP (ref 0.5–1.3)
DIFF PNL FLD: NEGATIVE — SIGNIFICANT CHANGE UP
EGFR: 131 ML/MIN/1.73M2 — SIGNIFICANT CHANGE UP
EOSINOPHIL # BLD AUTO: 0 K/UL — SIGNIFICANT CHANGE UP (ref 0–0.5)
EOSINOPHIL NFR BLD AUTO: 0 % — SIGNIFICANT CHANGE UP (ref 0–6)
GIANT PLATELETS BLD QL SMEAR: PRESENT — SIGNIFICANT CHANGE UP
GLUCOSE SERPL-MCNC: 76 MG/DL — SIGNIFICANT CHANGE UP (ref 70–99)
GLUCOSE UR QL: NEGATIVE MG/DL — SIGNIFICANT CHANGE UP
HCT VFR BLD CALC: 34 % — LOW (ref 34.5–45)
HGB BLD-MCNC: 11.4 G/DL — LOW (ref 11.5–15.5)
IANC: 6.34 K/UL — SIGNIFICANT CHANGE UP (ref 1.8–7.4)
KETONES UR-MCNC: ABNORMAL MG/DL
LEUKOCYTE ESTERASE UR-ACNC: NEGATIVE — SIGNIFICANT CHANGE UP
LYMPHOCYTES # BLD AUTO: 0.71 K/UL — LOW (ref 1–3.3)
LYMPHOCYTES # BLD AUTO: 8.8 % — LOW (ref 13–44)
MACROCYTES BLD QL: SLIGHT — SIGNIFICANT CHANGE UP
MCHC RBC-ENTMCNC: 27.5 PG — SIGNIFICANT CHANGE UP (ref 27–34)
MCHC RBC-ENTMCNC: 33.5 G/DL — SIGNIFICANT CHANGE UP (ref 32–36)
MCV RBC AUTO: 81.9 FL — SIGNIFICANT CHANGE UP (ref 80–100)
MICROCYTES BLD QL: SLIGHT — SIGNIFICANT CHANGE UP
MONOCYTES # BLD AUTO: 0.42 K/UL — SIGNIFICANT CHANGE UP (ref 0–0.9)
MONOCYTES NFR BLD AUTO: 5.2 % — SIGNIFICANT CHANGE UP (ref 2–14)
NEUTROPHILS # BLD AUTO: 6.86 K/UL — SIGNIFICANT CHANGE UP (ref 1.8–7.4)
NEUTROPHILS NFR BLD AUTO: 85.1 % — HIGH (ref 43–77)
NITRITE UR-MCNC: NEGATIVE — SIGNIFICANT CHANGE UP
PH UR: 6 — SIGNIFICANT CHANGE UP (ref 5–8)
PLAT MORPH BLD: NORMAL — SIGNIFICANT CHANGE UP
PLATELET # BLD AUTO: 217 K/UL — SIGNIFICANT CHANGE UP (ref 150–400)
PLATELET COUNT - ESTIMATE: NORMAL — SIGNIFICANT CHANGE UP
POLYCHROMASIA BLD QL SMEAR: SLIGHT — SIGNIFICANT CHANGE UP
POTASSIUM SERPL-MCNC: 3.8 MMOL/L — SIGNIFICANT CHANGE UP (ref 3.5–5.3)
POTASSIUM SERPL-SCNC: 3.8 MMOL/L — SIGNIFICANT CHANGE UP (ref 3.5–5.3)
PROT SERPL-MCNC: 6 G/DL — SIGNIFICANT CHANGE UP (ref 6–8.3)
PROT UR-MCNC: NEGATIVE MG/DL — SIGNIFICANT CHANGE UP
RBC # BLD: 4.15 M/UL — SIGNIFICANT CHANGE UP (ref 3.8–5.2)
RBC # FLD: 21.9 % — HIGH (ref 10.3–14.5)
RBC BLD AUTO: ABNORMAL
RH IG SCN BLD-IMP: POSITIVE — SIGNIFICANT CHANGE UP
SODIUM SERPL-SCNC: 137 MMOL/L — SIGNIFICANT CHANGE UP (ref 135–145)
SP GR SPEC: 1.01 — SIGNIFICANT CHANGE UP (ref 1–1.03)
UROBILINOGEN FLD QL: 0.2 MG/DL — SIGNIFICANT CHANGE UP (ref 0.2–1)
VARIANT LYMPHS # BLD: 0.9 % — SIGNIFICANT CHANGE UP (ref 0–6)
VARIANT LYMPHS NFR BLD MANUAL: 0.9 % — SIGNIFICANT CHANGE UP (ref 0–6)
WBC # BLD: 8.06 K/UL — SIGNIFICANT CHANGE UP (ref 3.8–10.5)
WBC # FLD AUTO: 8.06 K/UL — SIGNIFICANT CHANGE UP (ref 3.8–10.5)

## 2025-02-26 ENCOUNTER — APPOINTMENT (OUTPATIENT)
Dept: ANTEPARTUM | Facility: CLINIC | Age: 30
End: 2025-02-26
Payer: MEDICAID

## 2025-02-26 ENCOUNTER — OUTPATIENT (OUTPATIENT)
Dept: INPATIENT UNIT | Facility: HOSPITAL | Age: 30
LOS: 1 days | Discharge: HOME | End: 2025-02-26
Payer: MEDICAID

## 2025-02-26 VITALS
TEMPERATURE: 99 F | RESPIRATION RATE: 17 BRPM | HEART RATE: 104 BPM | SYSTOLIC BLOOD PRESSURE: 120 MMHG | DIASTOLIC BLOOD PRESSURE: 66 MMHG

## 2025-02-26 VITALS — DIASTOLIC BLOOD PRESSURE: 55 MMHG | HEART RATE: 85 BPM | SYSTOLIC BLOOD PRESSURE: 109 MMHG

## 2025-02-26 DIAGNOSIS — O26.899 OTHER SPECIFIED PREGNANCY RELATED CONDITIONS, UNSPECIFIED TRIMESTER: ICD-10-CM

## 2025-02-26 PROCEDURE — 59025 FETAL NON-STRESS TEST: CPT | Mod: 26

## 2025-02-26 PROCEDURE — 99213 OFFICE O/P EST LOW 20 MIN: CPT | Mod: 25

## 2025-02-26 PROCEDURE — 76819 FETAL BIOPHYS PROFIL W/O NST: CPT | Mod: 26

## 2025-02-26 PROCEDURE — 76815 OB US LIMITED FETUS(S): CPT | Mod: 26

## 2025-02-26 NOTE — OB PROVIDER TRIAGE NOTE - NSHPPHYSICALEXAM_GEN_ALL_CORE
ICU Vital Signs Last 24 Hrs  T(C): 37.1 (26 Feb 2025 05:10), Max: 37.1 (26 Feb 2025 05:04)  T(F): 98.78 (26 Feb 2025 05:10), Max: 98.8 (26 Feb 2025 05:04)  HR: 85 (26 Feb 2025 06:23) (85 - 104)  BP: 109/55 (26 Feb 2025 06:23) (109/55 - 120/66)  RR: 18 (26 Feb 2025 05:10) (17 - 18)    General: Patient sitting comfortably in bed, A&Ox3, in no acute distress  Abd: soft, non-tender, gravid, TOCO in place, mild ctx palpated  Bedside Transabdominal Sonogram: Vertex presentation, anterior placenta, +FH noted, M mode 153 BPM, JAZIEL 12.88, BPP 8/8, images saved in ASOB  EFM: 125 BPM baseline, moderate variability, +accels, - decels, category I tracing, reactive NST  [place]: Irregular contractions  SVE: 1.5/70/-3  Ext:  FROM bilateral no edema  Neuro: grossly intact

## 2025-02-26 NOTE — OB RN TRIAGE NOTE - FALL HARM RISK - UNIVERSAL INTERVENTIONS
Bed in lowest position, wheels locked, appropriate side rails in place/Call bell, personal items and telephone in reach/Instruct patient to call for assistance before getting out of bed or chair/Non-slip footwear when patient is out of bed/[place] to call system/Physically safe environment - no spills, clutter or unnecessary equipment/Purposeful Proactive Rounding/Room/bathroom lighting operational, light cord in reach

## 2025-02-26 NOTE — OB PROVIDER TRIAGE NOTE - NSOBPROVIDERNOTE_OBGYN_ALL_OB_FT
A: 28 y/o  @ 37w3d GA in early latent labor, in no acute distress, not requiring pain control. H/o anemia. H/o anxiety.    P: No evidence of acute process at this time     Minimal change from prior SVE     1.5/70/-3 at this time     [place]: Irregular ctx     EFM: Cat I tracing, reactive NST     Patient in no acute distress, does not require pain control     BPP 8/, JAZIEL 12.99     Has f/u appt tomorrow 25     Reassuring fetal/maternal status     Stable for d/c    Discharged @ 0631  - Patient to be discharged home with follow up and return precautions  - Please follow up with your obstetrician at your next scheduled appointment.   - Please return for decreased / no fetal movement, vaginal bleeding similar to that of a period, leaking / gush of fluid, regular contractions occurring 4-5 minutes  for one hour or requiring pain medication   - Patient educated of plan and demonstrates understanding. All questions answered. Discharge instructions provided and signed    Case discussed with Dr Júnior Olvera PA-C

## 2025-02-26 NOTE — OB PROVIDER TRIAGE NOTE - NS_DISCHARGEPRINT_OBGYN_ALL_OB
LYNDSEY [employer] lost internet connection. She is heading into clinic now, but will need to reschedule to 4:30PM today. Clinic # provided for reschedule.    General OB Triage Discharge Instructions

## 2025-02-26 NOTE — OB PROVIDER TRIAGE NOTE - HISTORY OF PRESENT ILLNESS
28 y/o  at 37w3d GA, LENCHO 25, presenting with c/o contractions x1 day.  Patient states that contractions have been irregular and occur ~q10 mins.   Patient currently rates her pain to be a 5 out of 10 in severity, does not require anything for pain control.  Was seen in triage for similar complaint on 25, was 1/50/-3 on SVE at that time.  Endorses good fetal movement. Denies: fever, vaginal bleeding, leakage of fluid, nausea, vomiting.    Primary OB care with [facility], has f/u appt tomorrow 25  H/o anemia, receives iron transfusions  H/o anxiety, not on meds, does not see therapist  H/o hyperemesis earlier this pregnancy, resolved  GBS: Positive

## 2025-02-26 NOTE — OB PROVIDER TRIAGE NOTE - NS_OBGYNHISTORY_OBGYN_ALL_OB_FT
OB History:   - TOP (2020) w/ D&C    GYN History: Denies history of endometriosis/uterine fibroids/cysts/abnormal pap smears/STIs

## 2025-02-26 NOTE — OB PROVIDER TRIAGE NOTE - NSICDXFAMILYHX_GEN_ALL_CORE_FT
HPI:    Patient ID: Scottie Horner is a 63year old female. Annual Preventative Exam  Guy Dominguez arrives today for annual preventative physical examination. The patient complains of no new problems and has 0/10 pain.  She will continue to take all of her medic Diagnosis Date   • Abscess of left thigh    • Acute meniscal tear of knee    • Age-related nuclear cataract of both eyes 2/10/2015   • Anal sphincter incontinence 4/28/2014   • Chondromalacia    • Colon polyps    • Degenerative disc disease    • Divertic Ophthalmic Solution INSTILL 1 DROP INTO BOTH EYES EVERY NIGHT Disp: 3 Bottle Rfl: 3   TraMADol HCl 50 MG Oral Tab Take 1-2 tablets ( mg total) by mouth every 4 (four) hours as needed for Pain.  Disp: 15 tablet Rfl: 0   Pantoprazole Sodium 40 MG Oral T equal, round, and reactive to light. Right eye exhibits no discharge. Left eye exhibits no discharge. No scleral icterus. Neck: Normal range of motion. Neck supple. Cardiovascular: Normal rate, regular rhythm and normal heart sounds.   Exam reveals no g unremarkable. The patient complains of no new problems and has 0/10 pain. Ordered CBC, CMP, lipid panel, Assay, TSH and Vitamin D-25 hydroxy for annual evaluation.     (I10) Essential hypertension  Plan: On exam, blood pressure 119/76-controlled.  Patient d AM FAMILY HISTORY:  Father  Still living? Yes, Estimated age: Age Unknown  FH: hypertension, Age at diagnosis: Age Unknown    Grandparent  Still living? No  FH: stroke, Age at diagnosis: Age Unknown

## 2025-02-26 NOTE — OB PROVIDER TRIAGE NOTE - NS_FETALMONCTXPAT_OBGYN_ALL_OB
Principal Discharge DX:	Premature infant of 35 weeks gestation  Goal:	Maintain optimal growth and development  Assessment and plan of treatment:	Continue feeding every 3 hours  F/U with PMD in 24-48 hours post discharge
Irregular Contractions

## 2025-02-27 ENCOUNTER — APPOINTMENT (OUTPATIENT)
Dept: ANTEPARTUM | Facility: CLINIC | Age: 30
End: 2025-02-27

## 2025-02-27 ENCOUNTER — INPATIENT (INPATIENT)
Facility: HOSPITAL | Age: 30
LOS: 2 days | Discharge: HOME | End: 2025-03-02
Attending: STUDENT IN AN ORGANIZED HEALTH CARE EDUCATION/TRAINING PROGRAM | Admitting: STUDENT IN AN ORGANIZED HEALTH CARE EDUCATION/TRAINING PROGRAM
Payer: MEDICAID

## 2025-02-27 VITALS
HEART RATE: 107 BPM | TEMPERATURE: 98 F | RESPIRATION RATE: 14 BRPM | SYSTOLIC BLOOD PRESSURE: 123 MMHG | DIASTOLIC BLOOD PRESSURE: 76 MMHG

## 2025-02-27 DIAGNOSIS — F41.9 ANXIETY DISORDER, UNSPECIFIED: ICD-10-CM

## 2025-02-27 DIAGNOSIS — Z3A.37 37 WEEKS GESTATION OF PREGNANCY: ICD-10-CM

## 2025-02-27 DIAGNOSIS — O26.899 OTHER SPECIFIED PREGNANCY RELATED CONDITIONS, UNSPECIFIED TRIMESTER: ICD-10-CM

## 2025-02-27 DIAGNOSIS — O47.1 FALSE LABOR AT OR AFTER 37 COMPLETED WEEKS OF GESTATION: ICD-10-CM

## 2025-02-27 DIAGNOSIS — O99.343 OTHER MENTAL DISORDERS COMPLICATING PREGNANCY, THIRD TRIMESTER: ICD-10-CM

## 2025-02-27 DIAGNOSIS — D64.9 ANEMIA, UNSPECIFIED: ICD-10-CM

## 2025-02-27 DIAGNOSIS — O99.013 ANEMIA COMPLICATING PREGNANCY, THIRD TRIMESTER: ICD-10-CM

## 2025-02-27 DIAGNOSIS — Z98.890 OTHER SPECIFIED POSTPROCEDURAL STATES: Chronic | ICD-10-CM

## 2025-02-27 LAB
BASOPHILS # BLD AUTO: 0.03 K/UL — SIGNIFICANT CHANGE UP (ref 0–0.2)
BASOPHILS NFR BLD AUTO: 0.4 % — SIGNIFICANT CHANGE UP (ref 0–2)
BLD GP AB SCN SERPL QL: NEGATIVE — SIGNIFICANT CHANGE UP
EOSINOPHIL # BLD AUTO: 0.05 K/UL — SIGNIFICANT CHANGE UP (ref 0–0.5)
EOSINOPHIL NFR BLD AUTO: 0.7 % — SIGNIFICANT CHANGE UP (ref 0–6)
HCT VFR BLD CALC: 34.3 % — LOW (ref 34.5–45)
HGB BLD-MCNC: 11.5 G/DL — SIGNIFICANT CHANGE UP (ref 11.5–15.5)
IANC: 4.73 K/UL — SIGNIFICANT CHANGE UP (ref 1.8–7.4)
IMM GRANULOCYTES NFR BLD AUTO: 1.6 % — HIGH (ref 0–0.9)
LYMPHOCYTES # BLD AUTO: 1.51 K/UL — SIGNIFICANT CHANGE UP (ref 1–3.3)
LYMPHOCYTES # BLD AUTO: 21.4 % — SIGNIFICANT CHANGE UP (ref 13–44)
MCHC RBC-ENTMCNC: 27.4 PG — SIGNIFICANT CHANGE UP (ref 27–34)
MCHC RBC-ENTMCNC: 33.5 G/DL — SIGNIFICANT CHANGE UP (ref 32–36)
MCV RBC AUTO: 81.9 FL — SIGNIFICANT CHANGE UP (ref 80–100)
MONOCYTES # BLD AUTO: 0.63 K/UL — SIGNIFICANT CHANGE UP (ref 0–0.9)
MONOCYTES NFR BLD AUTO: 8.9 % — SIGNIFICANT CHANGE UP (ref 2–14)
NEUTROPHILS # BLD AUTO: 4.73 K/UL — SIGNIFICANT CHANGE UP (ref 1.8–7.4)
NEUTROPHILS NFR BLD AUTO: 67 % — SIGNIFICANT CHANGE UP (ref 43–77)
NRBC # BLD AUTO: 0 K/UL — SIGNIFICANT CHANGE UP (ref 0–0)
NRBC # FLD: 0 K/UL — SIGNIFICANT CHANGE UP (ref 0–0)
NRBC BLD AUTO-RTO: 0 /100 WBCS — SIGNIFICANT CHANGE UP (ref 0–0)
PLATELET # BLD AUTO: 200 K/UL — SIGNIFICANT CHANGE UP (ref 150–400)
RBC # BLD: 4.19 M/UL — SIGNIFICANT CHANGE UP (ref 3.8–5.2)
RBC # FLD: 21.7 % — HIGH (ref 10.3–14.5)
RH IG SCN BLD-IMP: POSITIVE — SIGNIFICANT CHANGE UP
RH IG SCN BLD-IMP: POSITIVE — SIGNIFICANT CHANGE UP
RUBV IGG SER-ACNC: 0.28 INDEX — SIGNIFICANT CHANGE UP
RUBV IGG SER-IMP: NEGATIVE — SIGNIFICANT CHANGE UP
T PALLIDUM AB TITR SER: NEGATIVE — SIGNIFICANT CHANGE UP
WBC # BLD: 7.06 K/UL — SIGNIFICANT CHANGE UP (ref 3.8–10.5)
WBC # FLD AUTO: 7.06 K/UL — SIGNIFICANT CHANGE UP (ref 3.8–10.5)

## 2025-02-27 RX ORDER — OXYTOCIN-SODIUM CHLORIDE 0.9% IV SOLN 30 UNIT/500ML 30-0.9/5 UT/ML-%
167 SOLUTION INTRAVENOUS
Qty: 30 | Refills: 0 | Status: DISCONTINUED | OUTPATIENT
Start: 2025-02-27 | End: 2025-02-28

## 2025-02-27 RX ORDER — CITRIC ACID/SODIUM CITRATE 300-500 MG
15 SOLUTION, ORAL ORAL EVERY 6 HOURS
Refills: 0 | Status: DISCONTINUED | OUTPATIENT
Start: 2025-02-27 | End: 2025-02-28

## 2025-02-27 RX ORDER — OXYTOCIN-SODIUM CHLORIDE 0.9% IV SOLN 30 UNIT/500ML 30-0.9/5 UT/ML-%
167 SOLUTION INTRAVENOUS
Qty: 30 | Refills: 0 | Status: DISCONTINUED | OUTPATIENT
Start: 2025-02-27 | End: 2025-02-27

## 2025-02-27 RX ORDER — AMPICILLIN SODIUM 1 G/1
1 INJECTION, POWDER, FOR SOLUTION INTRAMUSCULAR; INTRAVENOUS EVERY 4 HOURS
Refills: 0 | Status: DISCONTINUED | OUTPATIENT
Start: 2025-02-27 | End: 2025-02-27

## 2025-02-27 RX ORDER — OXYTOCIN-SODIUM CHLORIDE 0.9% IV SOLN 30 UNIT/500ML 30-0.9/5 UT/ML-%
2 SOLUTION INTRAVENOUS
Qty: 30 | Refills: 0 | Status: DISCONTINUED | OUTPATIENT
Start: 2025-02-27 | End: 2025-03-02

## 2025-02-27 RX ORDER — SODIUM CHLORIDE 9 G/1000ML
1000 INJECTION, SOLUTION INTRAVENOUS
Refills: 0 | Status: DISCONTINUED | OUTPATIENT
Start: 2025-02-27 | End: 2025-02-27

## 2025-02-27 RX ORDER — SODIUM CHLORIDE 9 G/1000ML
1000 INJECTION, SOLUTION INTRAVENOUS
Refills: 0 | Status: DISCONTINUED | OUTPATIENT
Start: 2025-02-27 | End: 2025-02-28

## 2025-02-27 RX ORDER — AMPICILLIN SODIUM 1 G/1
1 INJECTION, POWDER, FOR SOLUTION INTRAMUSCULAR; INTRAVENOUS EVERY 4 HOURS
Refills: 0 | Status: DISCONTINUED | OUTPATIENT
Start: 2025-02-27 | End: 2025-02-28

## 2025-02-27 RX ORDER — SODIUM CHLORIDE 9 G/1000ML
1000 INJECTION, SOLUTION INTRAVENOUS ONCE
Refills: 0 | Status: DISCONTINUED | OUTPATIENT
Start: 2025-02-27 | End: 2025-02-27

## 2025-02-27 RX ORDER — AMPICILLIN SODIUM 1 G/1
2 INJECTION, POWDER, FOR SOLUTION INTRAMUSCULAR; INTRAVENOUS ONCE
Refills: 0 | Status: COMPLETED | OUTPATIENT
Start: 2025-02-27 | End: 2025-02-27

## 2025-02-27 RX ORDER — INFLUENZA A VIRUS A/IDAHO/07/2018 (H1N1) ANTIGEN (MDCK CELL DERIVED, PROPIOLACTONE INACTIVATED, INFLUENZA A VIRUS A/INDIANA/08/2018 (H3N2) ANTIGEN (MDCK CELL DERIVED, PROPIOLACTONE INACTIVATED), INFLUENZA B VIRUS B/SINGAPORE/INFTT-16-0610/2016 ANTIGEN (MDCK CELL DERIVED, PROPIOLACTONE INACTIVATED), INFLUENZA B VIRUS B/IOWA/06/2017 ANTIGEN (MDCK CELL DERIVED, PROPIOLACTONE INACTIVATED) 15; 15; 15; 15 UG/.5ML; UG/.5ML; UG/.5ML; UG/.5ML
0.5 INJECTION, SUSPENSION INTRAMUSCULAR ONCE
Refills: 0 | Status: DISCONTINUED | OUTPATIENT
Start: 2025-02-27 | End: 2025-03-02

## 2025-02-27 RX ADMIN — Medication 1 APPLICATION(S): at 06:02

## 2025-02-27 RX ADMIN — SODIUM CHLORIDE 125 MILLILITER(S): 9 INJECTION, SOLUTION INTRAVENOUS at 05:18

## 2025-02-27 RX ADMIN — AMPICILLIN SODIUM 100 GRAM(S): 1 INJECTION, POWDER, FOR SOLUTION INTRAMUSCULAR; INTRAVENOUS at 18:00

## 2025-02-27 RX ADMIN — AMPICILLIN SODIUM 100 GRAM(S): 1 INJECTION, POWDER, FOR SOLUTION INTRAMUSCULAR; INTRAVENOUS at 10:00

## 2025-02-27 RX ADMIN — AMPICILLIN SODIUM 100 GRAM(S): 1 INJECTION, POWDER, FOR SOLUTION INTRAMUSCULAR; INTRAVENOUS at 22:32

## 2025-02-27 RX ADMIN — AMPICILLIN SODIUM 100 GRAM(S): 1 INJECTION, POWDER, FOR SOLUTION INTRAMUSCULAR; INTRAVENOUS at 14:00

## 2025-02-27 RX ADMIN — AMPICILLIN SODIUM 200 GRAM(S): 1 INJECTION, POWDER, FOR SOLUTION INTRAMUSCULAR; INTRAVENOUS at 05:19

## 2025-02-27 NOTE — OB PROVIDER H&P - NSLOWPPHRISK_OBGYN_A_OB
No previous uterine incision/[place] Pregnancy/Less than or equal to 4 previous vaginal births/No known bleeding disorder/No history of postpartum hemorrhage/No other PPH risks indicated

## 2025-02-27 NOTE — OB PROVIDER H&P - PROBLEM SELECTOR PLAN 1
P: Admit to L&D      Expectant management at this time      Repeat SVE and will augment PRN      Ampicillin for GBS prophylaxis      Clear liquid diet      EFM/TOCO      IV access       Admit labs       Consents signed

## 2025-02-27 NOTE — OB PROVIDER H&P - NS_OBGYNHISTORY_OBGYN_ALL_OB_FT
OB History:   - TOP x1 with D&C (2020)    GYN History: Denies history of endometriosis/uterine fibroids/cysts/abnormal pap smears/STIs.

## 2025-02-27 NOTE — OB PROVIDER LABOR PROGRESS NOTE - ASSESSMENT
29y  37w4d PROM@  1:30a    -Patient still hesitant to start pitocin at this time. She reports she is feeling contractions and would like to let her body go into labor by herself.  -Patient requesting to speak to attending Dr. Callejas at this time.  -Patient hesitant and not yet signing refusal form.    D/W Dr. Júnior Pabon, PGY1  29y  37w4d PROM@  1:30a    -Patient still hesitant to start pitocin at this time. She reports she is feeling contractions and would like to let her body go into labor by herself.  -Patient requesting to speak to attending Dr. Callejas at this time.  -Patient hesitant and not yet signing refusal form.  -Counseled patient on the medical recommendation to start pitocin at this time given almost 24hrs PROM without change in cervical exam. D/W patient increased risk of infection and poor fetal outcome at this time.    D/W Dr. Júnior Pabon, PGY1

## 2025-02-27 NOTE — OB PROVIDER LABOR PROGRESS NOTE - ASSESSMENT
Patient tolerated exam well. Not making cervical change despite being ruptured since 1:30AM. Discussed induction with patient, however patient continues to refuse induction agents. The risk of chorio due to prolonged rupture was discussed, including the increased bob of fetal tachycardia and postpartum hemorrhage. Patient continues to decline induction and would like to wait "a few more hours" to assess for cervical change. Will revisit discussion with patient within the hour.     - Continue EFM/[place]    dw Dr. Manda Leavitt, PGY-1

## 2025-02-27 NOTE — OB PROVIDER H&P - ASSESSMENT
A: 28 y/o  @ 37w3d GA with PROM since :30, irregular contractions on toco. GBS positive. H/o anemia.    P: Admit to L&D      Expectant management at this time      Repeat SVE and will augment PRN      Ampicillin for GBS prophylaxis      Clear liquid diet      EFM/TOCO      IV access       Admit labs       Consents signed    Risks, benefits, alternatives, and possible complications have been discussed in detail by Dr Barrios with the patient in her native language, Pre-admission, admission, post admission procedures and expectations were discussed in detail. All questions answered, all appropriate hospital consents were signed. Anticipate normal vaginal delivery.  Informed consent was obtained. The following was discussed:  - Induction/augmentation of labor: use of medication or cook balloon to begin or enhance labor  - Obstetrical management including maternal-fetal contraction monitoring    Case discussed with Dr Sophie Olvera PA-C

## 2025-02-27 NOTE — OB PROVIDER H&P - HISTORY OF PRESENT ILLNESS
28 y/o  at 37w3d GA, LENCHO 25, presenting with c/o leakage of fluid since 01:30 this morning.  Patient reports small initial trickle which increased in quantity since onset, reports continuous leakage at this time.  Patient reports irregular contractions, does not require pain control at this time.  Endorses good fetal movement. Denies: fever, vaginal bleeding, leakage of fluid, nausea, vomiting.    Primary OB care with [facility]  H/o anemia, receives iron transfusions  H/o anxiety, not on meds, does not see therapist  H/o heart murmur in childhood, did not see cardiology this pregnancy  H/o hyperemesis earlier this pregnancy, resolved  GBS: Positive  EFW based on Leopold's: 3300g     28 y/o  at 37w3d GA, LENCHO 25, presenting with c/o leakage of fluid since 01:30 this morning.  Patient reports small initial trickle which increased in quantity since onset, reports continuous leakage at this time.  Patient reports irregular contractions, does not require pain control at this time.  Endorses good fetal movement. Denies: fever, vaginal bleeding, leakage of fluid, nausea, vomiting.    Primary OB care with [facility]  H/o anemia, receives iron transfusions  H/o anxiety, not on meds, does not see therapist  H/o heart murmur in childhood, did not see cardiology this pregnancy, had normal stress test and echo a few months prior to pregnancy with PCP  H/o hyperemesis earlier this pregnancy, resolved  GBS: Positive  EFW based on Leopold's: 3300g

## 2025-02-28 ENCOUNTER — TRANSCRIPTION ENCOUNTER (OUTPATIENT)
Age: 30
End: 2025-02-28

## 2025-02-28 PROCEDURE — 93010 ELECTROCARDIOGRAM REPORT: CPT

## 2025-02-28 RX ORDER — CLOSTRIDIUM TETANI TOXOID ANTIGEN (FORMALDEHYDE INACTIVATED), CORYNEBACTERIUM DIPHTHERIAE TOXOID ANTIGEN (FORMALDEHYDE INACTIVATED), BORDETELLA PERTUSSIS TOXOID ANTIGEN (GLUTARALDEHYDE INACTIVATED), BORDETELLA PERTUSSIS FILAMENTOUS HEMAGGLUTININ ANTIGEN (FORMALDEHYDE INACTIVATED), BORDETELLA PERTUSSIS PERTACTIN ANTIGEN, AND BORDETELLA PERTUSSIS FIMBRIAE 2/3 ANTIGEN 5; 2; 2.5; 5; 3; 5 [LF]/.5ML; [LF]/.5ML; UG/.5ML; UG/.5ML; UG/.5ML; UG/.5ML
0.5 INJECTION, SUSPENSION INTRAMUSCULAR ONCE
Refills: 0 | Status: DISCONTINUED | OUTPATIENT
Start: 2025-02-28 | End: 2025-03-02

## 2025-02-28 RX ORDER — PRENATAL 136/IRON/FOLIC ACID 27 MG-1 MG
1 TABLET ORAL DAILY
Refills: 0 | Status: DISCONTINUED | OUTPATIENT
Start: 2025-02-28 | End: 2025-03-02

## 2025-02-28 RX ORDER — IBUPROFEN 200 MG
600 TABLET ORAL EVERY 6 HOURS
Refills: 0 | Status: DISCONTINUED | OUTPATIENT
Start: 2025-02-28 | End: 2025-03-02

## 2025-02-28 RX ORDER — WITCH HAZEL LEAF
1 FLUID EXTRACT MISCELLANEOUS
Qty: 0 | Refills: 0 | DISCHARGE
Start: 2025-02-28

## 2025-02-28 RX ORDER — SIMETHICONE 80 MG
80 TABLET,CHEWABLE ORAL EVERY 4 HOURS
Refills: 0 | Status: DISCONTINUED | OUTPATIENT
Start: 2025-02-28 | End: 2025-03-02

## 2025-02-28 RX ORDER — KETOROLAC TROMETHAMINE 30 MG/ML
30 INJECTION, SOLUTION INTRAMUSCULAR; INTRAVENOUS ONCE
Refills: 0 | Status: DISCONTINUED | OUTPATIENT
Start: 2025-02-28 | End: 2025-03-02

## 2025-02-28 RX ORDER — ACETAMINOPHEN 500 MG/5ML
975 LIQUID (ML) ORAL
Refills: 0 | Status: DISCONTINUED | OUTPATIENT
Start: 2025-02-28 | End: 2025-03-02

## 2025-02-28 RX ORDER — DIPHENHYDRAMINE HCL 12.5MG/5ML
25 ELIXIR ORAL EVERY 6 HOURS
Refills: 0 | Status: DISCONTINUED | OUTPATIENT
Start: 2025-02-28 | End: 2025-03-02

## 2025-02-28 RX ORDER — OXYTOCIN-SODIUM CHLORIDE 0.9% IV SOLN 30 UNIT/500ML 30-0.9/5 UT/ML-%
167 SOLUTION INTRAVENOUS
Qty: 30 | Refills: 0 | Status: DISCONTINUED | OUTPATIENT
Start: 2025-02-28 | End: 2025-03-02

## 2025-02-28 RX ORDER — MODIFIED LANOLIN 100 %
1 CREAM (GRAM) TOPICAL EVERY 6 HOURS
Refills: 0 | Status: DISCONTINUED | OUTPATIENT
Start: 2025-02-28 | End: 2025-03-02

## 2025-02-28 RX ORDER — WITCH HAZEL LEAF
1 FLUID EXTRACT MISCELLANEOUS EVERY 4 HOURS
Refills: 0 | Status: DISCONTINUED | OUTPATIENT
Start: 2025-02-28 | End: 2025-03-02

## 2025-02-28 RX ORDER — MAGNESIUM HYDROXIDE 400 MG/5ML
30 SUSPENSION ORAL
Refills: 0 | Status: DISCONTINUED | OUTPATIENT
Start: 2025-02-28 | End: 2025-03-02

## 2025-02-28 RX ORDER — OXYCODONE HYDROCHLORIDE 30 MG/1
5 TABLET ORAL ONCE
Refills: 0 | Status: DISCONTINUED | OUTPATIENT
Start: 2025-02-28 | End: 2025-03-02

## 2025-02-28 RX ORDER — IBUPROFEN 200 MG
1 TABLET ORAL
Qty: 0 | Refills: 0 | DISCHARGE
Start: 2025-02-28

## 2025-02-28 RX ORDER — MODIFIED LANOLIN 100 %
1 CREAM (GRAM) TOPICAL
Qty: 0 | Refills: 0 | DISCHARGE
Start: 2025-02-28

## 2025-02-28 RX ORDER — IBUPROFEN 200 MG
600 TABLET ORAL EVERY 6 HOURS
Refills: 0 | Status: COMPLETED | OUTPATIENT
Start: 2025-02-28 | End: 2026-01-27

## 2025-02-28 RX ORDER — HYDROCORTISONE 10 MG/G
1 CREAM TOPICAL EVERY 6 HOURS
Refills: 0 | Status: DISCONTINUED | OUTPATIENT
Start: 2025-02-28 | End: 2025-03-02

## 2025-02-28 RX ORDER — BENZOCAINE 220 MG/G
1 SPRAY, METERED PERIODONTAL
Qty: 0 | Refills: 0 | DISCHARGE
Start: 2025-02-28

## 2025-02-28 RX ORDER — PRAMOXINE HCL 1 %
1 GEL (GRAM) TOPICAL EVERY 4 HOURS
Refills: 0 | Status: DISCONTINUED | OUTPATIENT
Start: 2025-02-28 | End: 2025-03-02

## 2025-02-28 RX ORDER — OXYCODONE HYDROCHLORIDE 30 MG/1
5 TABLET ORAL
Refills: 0 | Status: DISCONTINUED | OUTPATIENT
Start: 2025-02-28 | End: 2025-03-02

## 2025-02-28 RX ORDER — BENZOCAINE 220 MG/G
1 SPRAY, METERED PERIODONTAL EVERY 6 HOURS
Refills: 0 | Status: DISCONTINUED | OUTPATIENT
Start: 2025-02-28 | End: 2025-03-02

## 2025-02-28 RX ORDER — DIBUCAINE 10 MG/G
1 OINTMENT TOPICAL EVERY 6 HOURS
Refills: 0 | Status: DISCONTINUED | OUTPATIENT
Start: 2025-02-28 | End: 2025-03-02

## 2025-02-28 RX ADMIN — OXYTOCIN-SODIUM CHLORIDE 0.9% IV SOLN 30 UNIT/500ML 167 MILLIUNIT(S)/MIN: 30-0.9/5 SOLUTION at 11:18

## 2025-02-28 RX ADMIN — SODIUM CHLORIDE 125 MILLILITER(S): 9 INJECTION, SOLUTION INTRAVENOUS at 08:40

## 2025-02-28 RX ADMIN — Medication 3 MILLILITER(S): at 21:30

## 2025-02-28 RX ADMIN — Medication 975 MILLIGRAM(S): at 21:30

## 2025-02-28 RX ADMIN — AMPICILLIN SODIUM 100 GRAM(S): 1 INJECTION, POWDER, FOR SOLUTION INTRAMUSCULAR; INTRAVENOUS at 06:26

## 2025-02-28 RX ADMIN — OXYTOCIN-SODIUM CHLORIDE 0.9% IV SOLN 30 UNIT/500ML 2 MILLIUNIT(S)/MIN: 30-0.9/5 SOLUTION at 08:40

## 2025-02-28 RX ADMIN — OXYTOCIN-SODIUM CHLORIDE 0.9% IV SOLN 30 UNIT/500ML 2 MILLIUNIT(S)/MIN: 30-0.9/5 SOLUTION at 00:36

## 2025-02-28 RX ADMIN — Medication 975 MILLIGRAM(S): at 20:36

## 2025-02-28 RX ADMIN — Medication 1 APPLICATION(S): at 01:44

## 2025-02-28 RX ADMIN — AMPICILLIN SODIUM 100 GRAM(S): 1 INJECTION, POWDER, FOR SOLUTION INTRAMUSCULAR; INTRAVENOUS at 02:17

## 2025-02-28 RX ADMIN — SODIUM CHLORIDE 125 MILLILITER(S): 9 INJECTION, SOLUTION INTRAVENOUS at 00:36

## 2025-02-28 NOTE — OB PROVIDER DELIVERY SUMMARY - NSPROVIDERDELIVERYNOTE_OBGYN_ALL_OB_FT
Patient found to be fully dilated and directed to push.  Spontaneous vaginal delivery of viable liveborn male infant.   Head, shoulders, and body delivered without complications. Nuchal cord times one delivered through   Infant was suctioned and passed to mother.   Delayed cord clamping performed, then clamped and cut.   Placenta delivered intact with a 3-vessel cord.   Fundal massage was given and uterine fundus was noted to be firm.   Vaginal exam revealed an intact cervix, vaginal walls, and sulci.   Patient has a second  degree laceration that was repaired with 2-0 chromic suture.   Excellent hemostasis noted.  Patient was stable and started postpartum recovery in room.   Count was correct x 2.     Infant: male   Apgar: 9/9  QBL: 105cc

## 2025-02-28 NOTE — DISCHARGE NOTE OB - MEDICATION SUMMARY - MEDICATIONS TO CHANGE
"Nutrition services: Day 1 of admit.  Miroslava Matta is a 64 y.o. female with admitting DX of Sepsis.  Consult received for Cardiac rehab diet education.    Spoke with pt at bedside. She was eating lunch at time of visit. She states she has had a decreased appetite recently. She declined the offer of Ensure supplements, but did agree to try Sheldon. No questions per pt.    Assessment:  Height: 170.2 cm (5' 7.01\")  Weight: (!) 181 kg (399 lb 0.5 oz)  Body mass index is 62.48 kg/m²., BMI classification: Class 3 Obesity  Diet/Intake:Consistent CHO, 2 gm Sodium, 1800 ml fluid restriction. PO intake usually 50-75%.    Evaluation:   Pt found at home sitting in chair soiled in stool and urine. Pt with multiple wounds: unstageable wounds to thighs and right heel, full thickness wounds to lower legs per wound team note.  Labs 2/21 include Glu 133 (H), Alb 1.5 (L), CRP 8.31 (H)  Medications include Vitamin C, Vitamin B-12, Folic acid, Vitamin D, Lasix, SSI.  Class 3 Obesity. No recent weight history available to assess in chart review. Weight loss counseling not appropriate in acute care setting. If appropriate at [facility] please refer to outpatient nutrition services for weight management.   Cardiac diet education currently not indicated. No Cardiac procedures currently noted.    Malnutrition Risk: ASPEN criteria not met.    Recommendations/Plan:  Offer Sheldon BID with meals.   Encourage intake of meals and supplements >50%.  Document intake of all meals and supplements as % taken in ADL's to provide interdisciplinary communication across all shifts.   Monitor weight.  Nutrition rep will continue to see patient for ongoing meal and snack preferences.     RD following      "
Nutrition Update:    Day 5 of admit.  Miroslava Matta is a 64 y.o. female with admitting DX of Sepsis (HCC) [A41.9].  Patient being followed to optimize nutrition.    Current Diet: Consistent CHO, 2 Gm Na, 1800 mL fluid restriction, Sheldon. PO intake of meals has been % per ADLs. Per RN, pt had several Sheldon packets with her when transferred to Tele floor this morning. RD visited pt at bedside. She reports she's been saving some of the Sheldon for post-discharge. RD reviewed that Sheldon is dosage-based, with 2 packets/day the minimum dosage needed. Pt verbalized understanding and intent to start drinking BID. She confirmed dislike of other oral nutrition supplements, but was agreeable to adding higher-protein foods such as Greek yogurt to meal trays.     Problem: Nutritional:  Goal: Achieve adequate nutritional intake  Description: Patient will consume >75% of meals and supplements  Outcome: Progressing    RD continues to follow.    
Nutrition Update:    Day 8 of admit.  Miroslava Matta is a 64 y.o. female with admitting DX of Sepsis (HCC) [A41.9].  Patient being followed to optimize nutrition.    Current Diet: CHO consistent with 2 gm sodium modifier and 1800 ml fluid restriction. Pt receiving Sheldon BID.     Pt eating well with all meals at %. Continue to encourage intake of all meals and supplements.     Problem: Nutritional:  Goal: Achieve adequate nutritional intake  Description: Patient will consume >75% of meals  Outcome: Met    RD to monitor per department policy.     
I will SWITCH the dose or number of times a day I take the medications listed below when I get home from the hospital:  None

## 2025-02-28 NOTE — DISCHARGE NOTE OB - CARE PLAN
Principal Discharge DX:	 (normal spontaneous vaginal delivery)  Assessment and plan of treatment:	Term pregnancy now delivered  Routine pp care   1 Principal Discharge DX:	 (normal spontaneous vaginal delivery)  Assessment and plan of treatment:	After discharge, please stay on pelvic rest for 6 weeks, meaning no sexual intercourse, no tampons and no douching.  No driving for 2 weeks.  No lifting objects heavier than baby for 2 weeks.  Expect to have vaginal bleeding/spotting for up to six weeks.  The bleeding should get lighter and more white/light brown with time.  For bleeding soaking more than a pad an hour or passing clots greater than the size of your fist, come in to the emergency department.  Follow up in the office in 6 weeks

## 2025-02-28 NOTE — OB PROVIDER LABOR PROGRESS NOTE - NS_SUBJECTIVE/OBJECTIVE_OBGYN_ALL_OB_FT
Patient seen to assess for cervical change.
Patient seen and examined for progression of labor.     T(C): 36.9 (02-28-25 @ 05:46), Max: 37.9 (02-28-25 @ 04:42)  HR: 118 (02-28-25 @ 05:52) (82 - 157)  BP: 105/53 (02-28-25 @ 05:16) (94/58 - 140/65)  RR: 19 (02-28-25 @ 05:46) (16 - 19)  SpO2: 98% (02-28-25 @ 05:47) (96% - 99%)
Patient examined to determine progress. Pt still hesitant in starting pitocin at this time. Dr. Callejas aware
Patient seen due to feeling constant rectal pressure

## 2025-02-28 NOTE — OB PROVIDER DELIVERY SUMMARY - NSSELHIDDEN_OBGYN_ALL_OB_FT
[NS_DeliveryAttending1_OBGYN_ALL_OB_FT:[id number]=],[NS_DeliveryRN_OBGYN_ALL_OB_FT:[id number]=]

## 2025-02-28 NOTE — OB RN DELIVERY SUMMARY - NSSELHIDDEN_OBGYN_ALL_OB_FT
[NS_DeliveryAttending1_OBGYN_ALL_OB_FT:[id number]=],[NS_DeliveryRN_OBGYN_ALL_OB_FT:[id number]=]

## 2025-02-28 NOTE — PROVIDER CONTACT NOTE (OTHER) - SITUATION
Pt refusing administration of augmentation Pitocin at this time. Pt states she wants to see what her body will do on its own despite MD [place] recommendation to begin augmentation pitocin

## 2025-02-28 NOTE — PROVIDER CONTACT NOTE (OTHER) - ACTION/TREATMENT ORDERED:
Pt to be educated again on the recommendation to begin the administration of augmentation pitocin and the risks associated with prolonged rupture without dilation as per MD [place].

## 2025-02-28 NOTE — OB PROVIDER LABOR PROGRESS NOTE - NS_OBIHIFHRDETAILS_OBGYN_ALL_OB_FT
130, moderate, +Accels, -decels
140, moderate, +accels, -decels
135/mod/+accels/-decels
130 bpm, mod variability, +accels, no decels

## 2025-02-28 NOTE — DISCHARGE NOTE OB - PLAN OF CARE
Term pregnancy now delivered  Routine pp care After discharge, please stay on pelvic rest for 6 weeks, meaning no sexual intercourse, no tampons and no douching.  No driving for 2 weeks.  No lifting objects heavier than baby for 2 weeks.  Expect to have vaginal bleeding/spotting for up to six weeks.  The bleeding should get lighter and more white/light brown with time.  For bleeding soaking more than a pad an hour or passing clots greater than the size of your fist, come in to the emergency department.  Follow up in the office in 6 weeks

## 2025-02-28 NOTE — DISCHARGE NOTE OB - FINANCIAL ASSISTANCE
[facility] provides services at a reduced cost to those who are determined to be eligible through [facility]’s financial assistance program. Information regarding [facility]’s financial assistance program can be found by going to [email or web address] or by calling [phone].

## 2025-02-28 NOTE — DISCHARGE NOTE OB - ADDITIONAL INSTRUCTIONS
Follow up in 6 weeks for pp care Follow up in 6 weeks for postpartum visit Follow up in 2 weeks for postpartum visit

## 2025-02-28 NOTE — OB NEONATOLOGY/PEDIATRICIAN DELIVERY SUMMARY - NSPEDSNEONOTESA_OBGYN_ALL_OB_FT
Peds called to LDR for category II tracing. 37.4 wk male born via  to a 28 y/o  blood type A+ mother. Maternal history of anemia (iron tx x1), childhood murmur wit normal stress test and echo prior to pregnancy and plans to f/u PP, and anxiety (on no meds). Prenatal history of hyperemesis. One prior TOP with D&E. PNL significant for rubella NI, rest -/NR/I, GBS + per patient. Ampx7 received prior to delivery. SROM at 0115 on  with clear fluids. Baby emerged vigorous, crying, was w/d/s/s and left with mom for skin to skin. APGARS of 9/9. Mom plans to initiate breastfeeding and formula feed, declines Hep B vaccine and consents circ. Highest maternal temp 37.9, EOS 0.81.    : 2025  TOD: 10:11 Peds called to LDR for category II tracing. 37.4 wk AGA male born via  to a 28 y/o  blood type A+ mother. Maternal history of anemia (iron tx x1), childhood murmur wit normal stress test and echo prior to pregnancy and plans to f/u PP, and anxiety (on no meds). Prenatal history of hyperemesis. One prior TOP with D&E. PNL significant for rubella NI, rest -/NR/I, GBS + per patient. Ampx7 received prior to delivery. SROM at 0115 on  with clear fluids. Baby emerged vigorous, crying, was w/d/s/s and left with mom for skin to skin. APGARS of 9/9. Mom plans to initiate breastfeeding and formula feed, declines Hep B vaccine and consents circ. Highest maternal temp 37.9, EOS 0.81.    : 2025  TOD: 10:11  BW: 3210

## 2025-02-28 NOTE — OB RN DELIVERY SUMMARY - NS_SKINTOSKINA_OBGYN_ALL_OB
01/09/21 0031   Vent Information   Vent Type 980   Vent Mode AC/VC   Vt Ordered 550 mL   Rate Set 15 bmp   Peak Flow 55 L/min   Pressure Support 0 cmH20   FiO2  35 %   SpO2 96 %   SpO2/FiO2 ratio 274.29   Sensitivity 3   PEEP/CPAP 5   Humidification Source Heated wire   Humidification Temp 37   Vent Patient Data   Peak Inspiratory Pressure 38 cmH2O   Mean Airway Pressure 15 cmH20   Rate Measured 16 br/min   Vt Exhaled 631 mL   Minute Volume 9.74 Liters   I:E Ratio 1:2.60   Cough/Sputum   Sputum How Obtained Tracheal   Cough Productive   Sputum Amount Moderate   Sputum Color Creamy   Tenacity Thick   Spontaneous Breathing Trial (SBT) RT Doc   Pulse 83   Breath Sounds   Right Upper Lobe Rhonchi   Right Middle Lobe Rhonchi   Right Lower Lobe Rhonchi   Left Upper Lobe Rhonchi   Left Lower Lobe Rhonchi   Additional Respiratory  Assessments   Resp 16   Alarm Settings   High Pressure Alarm 55 cmH2O   Low Minute Volume Alarm 3 L/min   High Respiratory Rate 40 br/min   Low Exhaled Vt  200 mL   Surgical Airway (trach) Lorena Cuffed   Placement Date/Time: 12/31/20 6714   Placed By: Licensed provider  Surgical Airway Type: Tracheostomy  Brand: Lorena  Style: Cuffed  Size (mm): 6   Status Secured   Cuff Pressure 28 cm H2O Was done for at least one hour

## 2025-02-28 NOTE — DISCHARGE NOTE OB - PATIENT PORTAL LINK FT
You can access the FollowMyHealth Patient Portal offered by [facility] by registering at the following website: [email or web address]. By joining [facility]’s FollowMyHealth portal, you will also be able to view your health information using other applications (apps) compatible with our system.

## 2025-02-28 NOTE — CHART NOTE - NSCHARTNOTEFT_GEN_A_CORE
Patient seen at bedside to discuss further management for PROM 0130 on 2/27/2025. Previously declined augmentation following unchanged VE x2. Reports inconsistent contractions at this time, intermittently painful. Remains afebrile.     ICU Vital Signs Last 24 Hrs  T(C): 37.0 (27 Feb 2025 16:00), Max: 37.0 (27 Feb 2025 09:43)  T(F): 98.6 (27 Feb 2025 16:00), Max: 98.6 (27 Feb 2025 09:43)  HR: 82 (27 Feb 2025 15:29) (82 - 131)  BP: 113/58 (27 Feb 2025 15:29) (113/58 - 133/61)  RR: 16 (27 Feb 2025 16:00) (14 - 18)    O2 Parameters below as of 27 Feb 2025 05:29  Patient On (Oxygen Delivery Method): room air    Discussed with patient r/b/a of augmentation with Pitocin at this time. Discussed that unlikely that she is in active labor at this point, given her relative comfort and ability to talk through contractions. Recommend defer VE at this time for concern for increased risk of infection.    Discussed that pitocin is titrated up until consistent contractions changing the cervix can be maintained. Discussed that given lack of progression to active labor at this time, augmenting pitocin is recommended in light of increased risk of infection after 12 hours of ruptured membranes. Patient is GBS positive, receiving amp. Discussed that longer amount of time ruptured may be associated with increased rates of chorio. Discussed that chorio as well as protracted labor may increase risk to fetus including need for resuscitative efforts by the NICU for respiratory failure, sepsis and more.    Patient expresses understanding of the above risks. Requests time to discuss with partner, and will update team with their choices.     P:  -Cont EFM/toco  -Cont amp for GBS prophylaxis   -Pitocin if patient amenable  -Regional anesthesia PRN    Above dw MD Manda Briggs NP
Bolus ordered for tachycardia. Pt declining at this time     Dr. Barrios made aware  Barbara Leavitt, PGY-1
I engaged in a face to face discussion with patient and her support person that outlined my concerns for her clinical scenario. No change on cervical exam throughout duration of time at [facility]. I explained that, per ACOG, the definition of prolonged rupture of membranes is 18 hours and that she was approaching 24 hours, significantly increasing her risk of intrauturine infection which could cause permanent/severe morbidity/mortality to both her and fetus. I explained that in rare cases, people who have such prolonged labor courses can suffer from sepsis, hemorrhage, prolonged ICU stays and even hysterectomy which would render her infertile. I explained that a  has a very delicate, immature immune system and that they are vulnerable to  meningitis which could result in permanent neurological damage causing severe deficits and even could end in death. I explained that despite ampicillin for GBS there is still a risk for these previously outlined bad outcomes and that my strong recommendation is to initiate pitocin immediately to try and attempt a vaginal delivery. I explained approximately 30% of women in the [place] receive a  section either for maternal or fetal well being concerns and that there is no guarantee that the fetus would tolerate contractions. Patient's support person seemed in favor of initiating pitocin augmentation of labor however patient clearly states that she feels she is being pressured into initiating this medication and "knows in [her] gut that it isn't the right decision". She stated in clear language that she appreciates the time taken to outline my medical concerns and express my professional opinion however she knows people who have ended up with  sections which she attributes to pitocin. I gave the patient and her support person time to discuss my concerns/recommendations privately as she had already signed refusal of treatment form earlier in the day. Pt is aware that her choices are her autonomous rights however they are a major departure from safe/reasonable medical care for PROM.  RN present for all of above  discussed case w/ PGY4/PGY3 who are aware of plan

## 2025-02-28 NOTE — DISCHARGE NOTE OB - CARE PROVIDER_API CALL
Radha Barrios  Obstetrics and Gynecology  [address] [place] [id number]  Phone: [phone]  Follow Up Time:

## 2025-02-28 NOTE — OB RN DELIVERY SUMMARY - NSMECDELIVBABYA_OBGYN_ALL_OB
Orders given to DANIELLA Green RN    ----- Message from Clarice Katz sent at 10/14/2024  2:48 PM CDT -----  Obtain UA with reflex UC  push fluids.    Discontinue labs I ordered for tomorrow.    Check BMP and CBC on 10/17    no Delay Time (Ms): 2559 [facility] Cryogen Time (Ms): 10 Pulse Duration: 20 ms Spot Size: 10x3 mm Post-Care Instructions: I reviewed with the patient in detail post-care instructions. Patient should stay away from the sun and wear sun protection until treated areas are fully healed. Treated Area: medium area Spot Size: 7 mm Cryogen Time (Ms): [address]26[address] Rohit Cobb Pulse Duration: 10 ms Location Override: post injections bruising Fluence In J/Cm2 (Optional): 7.50 Delay Time (Ms): 20 Laser Type: Vbeam 595nm Treated Area: small area Detail Level: Zone Fluence In J/Cm2 (Optional): 7.00 Spot Size: 10 mm Price (Use Numbers Only, No Special Characters Or $): 0.00 Post-Procedure Care: Sunscreen applied. Post care reviewed with patient. Consent: Written consent obtained, risks reviewed including but not limited to crusting, scabbing, blistering, scarring, darker or lighter pigmentary change, incidental hair removal, bruising, and/or incomplete removal. Fluence In J/Cm2 (Optional): 10.0 Cryogen Time (Ms): 27 Pulse Duration: 6 ms Cryogen Time (Ms): 30

## 2025-02-28 NOTE — DISCHARGE NOTE OB - MEDICATION SUMMARY - MEDICATIONS TO TAKE
I will START or STAY ON the medications listed below when I get home from the hospital:    ibuprofen 600 mg oral tablet  -- 1 tab(s) by mouth every 6 hours  -- Indication: For pain    benzocaine 20% topical spray  -- 1 Apply on skin to affected area every 6 hours As needed for Perineal discomfort  -- Indication: For peirneal pain    lanolin topical ointment  -- 1 Apply on skin to affected area every 6 hours As needed nipple soreness  -- Indication: For sore nipples    witch hazel 50% topical pad  -- 1 Apply on skin to affected area every 4 hours As needed Perineal discomfort  -- Indication: For peirneal pain    PNV Prenatal oral tablet  -- 1 tab(s) by mouth once a day  -- Indication: For continued need    I will START or STAY ON the medications listed below when I get home from the hospital:    ibuprofen 600 mg oral tablet  -- 1 tab(s) by mouth every 6 hours as needed for  moderate pain  -- Indication: For Moderate pain    acetaminophen 325 mg oral tablet  -- 3 tab(s) by mouth every 6 hours as needed for  mild pain  -- Indication: For Mild pain    benzocaine 20% topical spray  -- 1 Apply on skin to affected area every 6 hours As needed for Perineal discomfort  -- Indication: For peirneal pain    lanolin topical ointment  -- 1 Apply on skin to affected area every 6 hours As needed nipple soreness  -- Indication: For sore nipples    witch hazel 50% topical pad  -- 1 Apply on skin to affected area every 4 hours As needed Perineal discomfort  -- Indication: For peirneal pain

## 2025-02-28 NOTE — OB PROVIDER LABOR PROGRESS NOTE - ASSESSMENT
29y  @ 37wks/4d here for IOL for PROM    - VE 1.5/-3  - PROM @ 130a ()  - Pitocin at 6u  - Cont fetal/maternal monitoring  - Will reassess PRN    Discussed with Dr. Júnior Cline, PGY-1

## 2025-02-28 NOTE — OB PROVIDER LABOR PROGRESS NOTE - ASSESSMENT
Patient tolerated exam well. Making cervical change. Currently tachycardic in the 130-140s and tachysystole. Afebrile (37C), Pitocin currently at 8.     - Decrease pitocin to 4 (nelson Thomas, PGY-4) due to tachysystole   - Continue EFM/[place]  - Anticipate     Dr. Rajan made aware, nelson Thomas, PGY-4  Barbara Leavitt, PGY-1     Patient tolerated exam well. Making cervical change. Currently tachycardic in the 130-140s and tachysystole. Afebrile (37C), Pitocin currently at 8.     - Decrease pitocin to 4 (nelson Thomas, PGY-4) due to tachysystole   - Continue EFM/[place]  - Anticipate     Dr. Barrios made aware, nelson Thomas, PGY-4  Barbara Leavitt, PGY-1     Patient tolerated exam well. Making cervical change. Currently tachycardic in the 130-140s and tachysystole. Afebrile (37C, oral), Pitocin currently at 8.     - Obtain repeat rectal temperature (most recent rectal 37.3C at 4:50a)  - EKG for tachycardia  - Decrease pitocin to 4 (nelson Thomas, PGY-4) due to tachysystole     Plan above nelson Thomas, PGY-4    - Continue EFM/[place]  - Anticipate     Dr. Barrios made aware  Barbara Leavitt, PGY-1     Patient tolerated exam well. Making cervical change. Currently tachycardic in the 130-140s and tachysystole. Afebrile (37C, oral), Pitocin currently at 8.     - Obtain repeat rectal temperature (most recent rectal 37.3C at 4:50a)  - EKG for tachycardia  - Decrease pitocin to 4 due to tachysystole     Plan above dw Dr. Thomas, PGY-4    - Continue EFM/[place]  - Anticipate     Dr. Barrios made aware  Barbara Leavitt, PGY-1

## 2025-02-28 NOTE — OB RN DELIVERY SUMMARY - NS_FORCEPSATTEMPT_OBGYN_ALL_OB
SPOKE WITH PATIENT..SHE WILL CALL BACK TO SCHEDULE AN APPOINTMENT WITH DR. SMITH NEXT WEEK.   Forceps were not used

## 2025-02-28 NOTE — OB RN DELIVERY SUMMARY - NS_SEPSISRSKCALC_OBGYN_ALL_OB_FT
EOS calculated successfully. EOS Risk Factor: 0.5/1000 live births ([facility] national incidence); GA=37w4d; Temp=100.22; ROM=32.933; GBS='Positive'; Antibiotics='GBS specific antibiotics > 2 hrs prior to birth'

## 2025-03-01 LAB
HCT VFR BLD CALC: 31.4 % — LOW (ref 34.5–45)
HGB BLD-MCNC: 10.5 G/DL — LOW (ref 11.5–15.5)

## 2025-03-01 RX ADMIN — Medication 600 MILLIGRAM(S): at 11:13

## 2025-03-01 RX ADMIN — Medication 600 MILLIGRAM(S): at 12:13

## 2025-03-01 RX ADMIN — Medication 975 MILLIGRAM(S): at 08:16

## 2025-03-01 RX ADMIN — Medication 975 MILLIGRAM(S): at 09:16

## 2025-03-01 RX ADMIN — Medication 3 MILLILITER(S): at 06:48

## 2025-03-01 RX ADMIN — Medication 600 MILLIGRAM(S): at 19:03

## 2025-03-01 RX ADMIN — Medication 600 MILLIGRAM(S): at 18:08

## 2025-03-01 RX ADMIN — Medication 3 MILLILITER(S): at 14:30

## 2025-03-01 NOTE — LACTATION INITIAL EVALUATION - LACTATION INTERVENTIONS
initiate/review safe skin-to-skin/initiate/review hand expression/initiate/review pumping guidelines and safe milk handling/initiate/review techniques for position and latch/initiate/review supplementation plan due to medical indications/review techniques to manage sore nipples/engorgement/initiate/review breast massage/compression/reviewed components of an effective feeding and at least 8 effective feedings per day required/reviewed importance of monitoring infant diapers, the breastfeeding log, and minimum output each day/reviewed risks of unnecessary formula supplementation/reviewed benefits and recommendations for rooming in/reviewed feeding on demand/by cue at least 8 times a day/recommended follow-up with pediatrician within 24 hours of discharge

## 2025-03-01 NOTE — LACTATION INITIAL EVALUATION - INTERVENTION OUTCOME
Assisted with deep latch and positioning in laid back on the left side. Infant latched on with a wide gape and a RS with a SSB pattern. Patient educated about infants less than 24h of age being sleepy. Patient was made aware of cluster feeding that occurs after 24h of life and to be cautious of sleep deprivation. In order to maintain infant and patient safety, patient was instructed to place infant in bassinet or call for assistance as needed. Guide to postpartum and  care book was reviewed. Patient was educated on the nutritional needs of the baby and how many wet and dirty diapers to expect. Recognition of feeding cues and to feed the baby on demand, based on cues,  and at least 8-12 times in a day was discussed. Instructed patient to wake the baby to feed if no feeding cues are seen within 3h since prior feed.  Use of feeding log to record feedings along with wet and dirty diapers was encouraged. Instructed in hand expression with good return demonstration.  Reviewed safe skin to skin. Patient verbalized understanding of  information and education given. All questions and concerns were answered./verbalizes understanding/needs met

## 2025-03-02 VITALS
DIASTOLIC BLOOD PRESSURE: 71 MMHG | HEART RATE: 92 BPM | OXYGEN SATURATION: 98 % | RESPIRATION RATE: 17 BRPM | SYSTOLIC BLOOD PRESSURE: 118 MMHG | TEMPERATURE: 98 F

## 2025-03-02 RX ORDER — ACETAMINOPHEN 500 MG/5ML
3 LIQUID (ML) ORAL
Qty: 0 | Refills: 0 | DISCHARGE
Start: 2025-03-02

## 2025-03-02 RX ADMIN — Medication 600 MILLIGRAM(S): at 06:03

## 2025-03-02 RX ADMIN — Medication 975 MILLIGRAM(S): at 03:06

## 2025-03-02 RX ADMIN — Medication 975 MILLIGRAM(S): at 02:36

## 2025-03-02 RX ADMIN — Medication 600 MILLIGRAM(S): at 05:33

## 2025-03-02 NOTE — PROGRESS NOTE ADULT - SUBJECTIVE AND OBJECTIVE BOX
POD #1 s/p vaginal delivery. Patient doing well, OOBAA. Tolerating diet. Pain is tolerable. No residual anesthetic issues or complications noted.   May Richards CRNA  
She is a 29y woman who is now post-partum day: 1    Subjective:  The patient feels well.  She is ambulating.   She is tolerating regular diet.  She denies nausea and vomiting; denies fever.  She is voiding.  Her pain is controlled.  She reports normal postpartum bleeding.  She is breastfeeding.  She is bonding with infant.    Physical exam:    Vital Signs Last 24 Hrs  T(C): 36.7 (01 Mar 2025 10:00), Max: 37.1 (28 Feb 2025 22:14)  T(F): 98 (01 Mar 2025 10:00), Max: 98.8 (01 Mar 2025 05:30)  HR: 89 (01 Mar 2025 10:00) (87 - 95)  BP: 110/67 (01 Mar 2025 10:00) (101/56 - 113/57)  BP(mean): --  RR: 18 (01 Mar 2025 10:00) (18 - 18)  SpO2: 99% (01 Mar 2025 10:00) (99% - 100%)    Parameters below as of 01 Mar 2025 10:00  Patient On (Oxygen Delivery Method): room air        Gen: NAD  Breast: Soft, nontender, not engorged.  Abdomen: Soft, nontender, no distension , firm uterine fundus at umbilicus.  Pelvic: Normal lochia noted  Ext: No calf tenderness    LABS:                        10.5   x     )-----------( x        ( 01 Mar 2025 05:48 )             31.4       Rubella status:     Allergies    latex (Hives)  Flagyl (Hives; Flushing)    Intolerances      MEDICATIONS  (STANDING):  acetaminophen     Tablet .. 975 milliGRAM(s) Oral <User Schedule>  diphtheria/tetanus/pertussis (acellular) Vaccine (Adacel) 0.5 milliLiter(s) IntraMuscular once  ibuprofen  Tablet. 600 milliGRAM(s) Oral every 6 hours  influenza   Vaccine 0.5 milliLiter(s) IntraMuscular once  ketorolac   Injectable 30 milliGRAM(s) IV Push once  oxytocin Infusion 167 milliUNIT(s)/Min (167 mL/Hr) IV Continuous <Continuous>  oxytocin Infusion. 2 milliUNIT(s)/Min (2 mL/Hr) IV Continuous <Continuous>  prenatal multivitamin 1 Tablet(s) Oral daily  sodium chloride 0.9% lock flush 3 milliLiter(s) IV Push every 8 hours    MEDICATIONS  (PRN):  benzocaine 20%/menthol 0.5% Spray 1 Spray(s) Topical every 6 hours PRN for Perineal discomfort  dibucaine 1% Ointment 1 Application(s) Topical every 6 hours PRN Perineal discomfort  diphenhydrAMINE 25 milliGRAM(s) Oral every 6 hours PRN Pruritus  hydrocortisone 1% Cream 1 Application(s) Topical every 6 hours PRN Moderate Pain (4-6)  lanolin Ointment 1 Application(s) Topical every 6 hours PRN nipple soreness  magnesium hydroxide Suspension 30 milliLiter(s) Oral two times a day PRN Constipation  oxyCODONE    IR 5 milliGRAM(s) Oral every 3 hours PRN Moderate to Severe Pain (4-10)  oxyCODONE    IR 5 milliGRAM(s) Oral once PRN Moderate to Severe Pain (4-10)  pramoxine 1%/zinc 5% Cream 1 Application(s) Topical every 4 hours PRN Moderate Pain (4-6)  simethicone 80 milliGRAM(s) Chew every 4 hours PRN Gas  witch hazel Pads 1 Application(s) Topical every 4 hours PRN Perineal discomfort                
NP: BESSIE day 2 Progress Note:     Patient seen at bedside resting comfortably offers no current complaints. Ambulating and voiding without difficulty.  Passing flatus and tolerating regular diet.  Bonding well with .  Breastfeeding exclusively . Denies HA, CP, SOB, N/V/D, dizziness, palpitations,  worsening vaginal bleeding, or any other concerns.      Vital Signs Last 24 Hrs  T(C): 36.8 (02 Mar 2025 05:38), Max: 36.8 (02 Mar 2025 05:38)  T(F): 98.2 (02 Mar 2025 05:38), Max: 98.2 (02 Mar 2025 05:38)  HR: 83 (02 Mar 2025 05:38) (75 - 92)  BP: 113/80 (02 Mar 2025 05:38) (97/55 - 120/78)  BP(mean): --  RR: 18 (02 Mar 2025 05:38) (16 - 18)  SpO2: 99% (02 Mar 2025 05:38) (98% - 99%)    Parameters below as of 02 Mar 2025 05:38  Patient On (Oxygen Delivery Method): room air        Physical Exam:     Gen: A&Ox 3, NAD  Breast: Soft, nontender, nonengorged  Abdomen: +BS, Soft, nontender, ND; Fundus firm below umbilicus  Gyn: mod lochia, intact/repaired  Ext: Nontender, DTRS 2+, no worsening edema                          10.5   x     )-----------( x        ( 01 Mar 2025 05:48 )             31.4

## 2025-03-02 NOTE — PROGRESS NOTE ADULT - ASSESSMENT
Assessment and Plan  PPD #2 s/p .  Hx MAGI, Anxiety/Depression Seen by SW     #MAGI  -H/H 11.5/34.3-->10.5/31.4   -Asymptomatic  -F/U Heme for IV iron (per pt she has apt this week)     #Post Partum  Her pain is well controlled.   She is tolerating a regular diet and passing flatus.   Denies N/V. Denies CP/SOB/lightheadedness/dizziness.   She is ambulating without difficulty.   Voiding spontaneously.    Patient is stable and doing well postpartum  - Continue regular diet.  - Increase ambulation.  - Continue motrin, tylenol, oxycodone PRN for pain control.   -Encourage breastfeeding.    -PP educational material reviewed and provided.  -PP & PPD Instructions reviewed.    -Discharge planning>>>D/C home     -Follow up @ [facility] in 2 weeks for postpartum visit  [phone]    Discussed with  MD Manda NEGRO   
28 yo PPD #1 s/p . Elevated LFTs. H/O Anemia. H/O: Anxiety/Depression    Acute Blood loss#  -QBL: 105ml  -H/H: 10.5/31.4  -H/O Antepartum Fe infusions. PP Fe infusion scheduled with Heme pp.   -Continue taking PO Fe and Vit C.  -Asymptomatic. Will continue to monitor.     Transaminitis#  -ALST/ALT: 40/75>30/39>20/14  -Asymptomatic  -Vitals stabls  -PP GI F/U scheduled as pp  -Will continue to monitor    Anxiety/Depression#  -Mood and affect appropriate.  -Husbands at the bedside and active in the pts care.  -Verbalizes good support at home.  -Denies harm to self or baby.  -Pending PP SW consult  -Will continue to monitor.     Doing well postpartum  Increase ambulation.  Encourage breastfeeding.  Continue taking PO pain meds PRN    PP & PPD Instructions reviewed.  PP educational materials reviewed & provided     Discussed with Dr. GUILLERMO Marcus MORENO-BC

## 2025-03-03 PROBLEM — D64.9 ANEMIA, UNSPECIFIED: Chronic | Status: ACTIVE | Noted: 2025-02-27

## 2025-03-04 ENCOUNTER — APPOINTMENT (OUTPATIENT)
Age: 30
End: 2025-03-04
Payer: MEDICAID

## 2025-03-04 PROCEDURE — S9445: CPT

## 2025-03-06 ENCOUNTER — APPOINTMENT (OUTPATIENT)
Age: 30
End: 2025-03-06
Payer: MEDICAID

## 2025-03-06 PROCEDURE — S9445: CPT | Mod: 95

## 2025-03-18 ENCOUNTER — NON-APPOINTMENT (OUTPATIENT)
Age: 30
End: 2025-03-18

## 2025-03-19 ENCOUNTER — NON-APPOINTMENT (OUTPATIENT)
Age: 30
End: 2025-03-19

## 2025-03-19 ENCOUNTER — APPOINTMENT (OUTPATIENT)
Dept: CARDIOLOGY | Facility: CLINIC | Age: 30
End: 2025-03-19
Payer: MEDICAID

## 2025-03-19 VITALS
WEIGHT: 115 LBS | HEIGHT: 60 IN | TEMPERATURE: 97.1 F | DIASTOLIC BLOOD PRESSURE: 76 MMHG | OXYGEN SATURATION: 98 % | HEART RATE: 84 BPM | BODY MASS INDEX: 22.58 KG/M2 | SYSTOLIC BLOOD PRESSURE: 110 MMHG

## 2025-03-19 PROCEDURE — 93000 ELECTROCARDIOGRAM COMPLETE: CPT

## 2025-03-19 PROCEDURE — 99204 OFFICE O/P NEW MOD 45 MIN: CPT | Mod: 25

## 2025-03-25 ENCOUNTER — APPOINTMENT (OUTPATIENT)
Age: 30
End: 2025-03-25

## 2025-03-31 NOTE — OB RN PATIENT PROFILE - FUNCTIONAL ASSESSMENT - BASIC MOBILITY 3.
Addended by: CHACORTA ZAVALA on: 3/31/2025 11:36 AM     Modules accepted: Orders     4 = No assist / stand by assistance

## 2025-04-04 NOTE — OB PROVIDER H&P - NSHPPHYSICALEXAM_GEN_ALL_CORE
36.4
ICU Vital Signs Last 24 Hrs  T(C): 36.8 (27 Feb 2025 03:29), Max: 37.1 (26 Feb 2025 05:04)  T(F): 98.2 (27 Feb 2025 03:29), Max: 98.8 (26 Feb 2025 05:04)  HR: 110 (27 Feb 2025 04:15) (85 - 121)  BP: 126/69 (27 Feb 2025 04:15) (109/55 - 133/61)  RR: 14 (27 Feb 2025 03:29) (14 - 18)    General: Patient sitting comfortably in bed, A&Ox3, in no acute distress  Abd: soft, non-tender, gravid, TOCO in place  Bedside Transabdominal Sonogram: Vertex presentation, posterior placenta, +FH noted, M mode 145 BPM, MVP 3.04cm, images saved in ASOB  EFM: 140 BPM baseline, moderate variability, +accels, - decels, category I tracing, reactive NST  [place]: Irregular contractions  SSE: Pooling of clear fluid  in posterior fornix, Nitrazine+, ferning+, no blood  SVE: 1/60/-3  Ext:  FROM bilateral no edema  Neuro: grossly intact

## 2025-04-07 ENCOUNTER — NON-APPOINTMENT (OUTPATIENT)
Age: 30
End: 2025-04-07

## 2025-04-11 ENCOUNTER — NON-APPOINTMENT (OUTPATIENT)
Age: 30
End: 2025-04-11

## 2025-04-16 ENCOUNTER — NON-APPOINTMENT (OUTPATIENT)
Age: 30
End: 2025-04-16

## 2025-04-17 ENCOUNTER — NON-APPOINTMENT (OUTPATIENT)
Age: 30
End: 2025-04-17

## 2025-04-21 ENCOUNTER — APPOINTMENT (OUTPATIENT)
Dept: HEPATOLOGY | Facility: CLINIC | Age: 30
End: 2025-04-21

## 2025-05-05 ENCOUNTER — NON-APPOINTMENT (OUTPATIENT)
Age: 30
End: 2025-05-05

## 2025-05-07 ENCOUNTER — NON-APPOINTMENT (OUTPATIENT)
Age: 30
End: 2025-05-07

## 2025-05-13 NOTE — OB RN TRIAGE NOTE - FALL HARM RISK - TYPE OF ASSESSMENT
You were seen and evaluated today for left sided abdominal pain.  Your test results demonstrated constipation, stable appendix w/ mucocele.   Please take all medications as instructed. Follow up with your care team, including GI, surgery, and PCP as discussed.   RETURN TO THE EMERGENCY DEPARTMENT if you develop new or worsening symptoms and are unable to see your PCP.       Daily Assessment

## 2025-06-05 NOTE — OB PROVIDER TRIAGE NOTE - AVIAN FLU SYMPTOMS
No Pt c/o numbness and tingling in both legs x 5 days, and numbness in hands started last night at 11pm. History of stroke in past

## 2025-06-23 ENCOUNTER — APPOINTMENT (OUTPATIENT)
Dept: GASTROENTEROLOGY | Facility: CLINIC | Age: 30
End: 2025-06-23

## 2025-08-07 ENCOUNTER — NON-APPOINTMENT (OUTPATIENT)
Age: 30
End: 2025-08-07

## 2025-08-13 NOTE — OB PROVIDER TRIAGE NOTE - NS_FETALMONCTX30_OBGYN_ALL_OB
Continued Stay SW/CM Assessment/Plan of Care Note     Active Substitute Decision Maker (SDM)    There are no active Substitute Decision Maker (SDM) on file.       Progress note:  The CM was notified by [facility] acute rehab that insurance authorization has been received. The patient needs a holter monitor to be placed. The CM spoke with the bedside nurse to have that placed and [facility] said they would take the patient after it is placed. The attending is also aware the patient can go to [facility] today.    Current Patient Status: Inpatient    See SW/CM flowsheets for other objective data.     Greater than 5 Contractions in 30 minutes

## 2025-08-19 ENCOUNTER — NON-APPOINTMENT (OUTPATIENT)
Age: 30
End: 2025-08-19

## 2025-09-08 ENCOUNTER — APPOINTMENT (OUTPATIENT)
Dept: HEPATOLOGY | Facility: CLINIC | Age: 30
End: 2025-09-08